# Patient Record
Sex: MALE | Race: BLACK OR AFRICAN AMERICAN | Employment: FULL TIME | URBAN - METROPOLITAN AREA
[De-identification: names, ages, dates, MRNs, and addresses within clinical notes are randomized per-mention and may not be internally consistent; named-entity substitution may affect disease eponyms.]

---

## 2017-07-13 ENCOUNTER — APPOINTMENT (OUTPATIENT)
Dept: GENERAL RADIOLOGY | Age: 57
DRG: 287 | End: 2017-07-13
Attending: EMERGENCY MEDICINE
Payer: COMMERCIAL

## 2017-07-13 ENCOUNTER — HOSPITAL ENCOUNTER (INPATIENT)
Age: 57
LOS: 2 days | Discharge: HOME | DRG: 287 | End: 2017-07-15
Attending: EMERGENCY MEDICINE | Admitting: INTERNAL MEDICINE
Payer: COMMERCIAL

## 2017-07-13 DIAGNOSIS — I24.9 ACS (ACUTE CORONARY SYNDROME) (HCC): Primary | ICD-10-CM

## 2017-07-13 DIAGNOSIS — R77.8 ELEVATED TROPONIN: ICD-10-CM

## 2017-07-13 DIAGNOSIS — R53.83 FATIGUE, UNSPECIFIED TYPE: ICD-10-CM

## 2017-07-13 PROBLEM — I10 ESSENTIAL HYPERTENSION: Status: ACTIVE | Noted: 2017-07-13

## 2017-07-13 PROBLEM — Z72.0 TOBACCO ABUSE: Status: ACTIVE | Noted: 2017-07-13

## 2017-07-13 LAB
ALBUMIN SERPL BCP-MCNC: 3.6 G/DL (ref 3.5–5)
ALBUMIN/GLOB SERPL: 0.9 {RATIO} (ref 1.1–2.2)
ALP SERPL-CCNC: 108 U/L (ref 45–117)
ALT SERPL-CCNC: 60 U/L (ref 12–78)
ANION GAP BLD CALC-SCNC: 8 MMOL/L (ref 5–15)
APPEARANCE UR: CLEAR
AST SERPL W P-5'-P-CCNC: 56 U/L (ref 15–37)
BACTERIA URNS QL MICRO: NEGATIVE /HPF
BASOPHILS # BLD AUTO: 0.1 K/UL (ref 0–0.1)
BASOPHILS # BLD: 2 % (ref 0–1)
BILIRUB SERPL-MCNC: 0.4 MG/DL (ref 0.2–1)
BILIRUB UR QL CFM: NEGATIVE
BUN SERPL-MCNC: 13 MG/DL (ref 6–20)
BUN/CREAT SERPL: 9 (ref 12–20)
CALCIUM SERPL-MCNC: 8.6 MG/DL (ref 8.5–10.1)
CAOX CRY URNS QL MICRO: ABNORMAL
CHLORIDE SERPL-SCNC: 110 MMOL/L (ref 97–108)
CK MB CFR SERPL CALC: 1.5 % (ref 0–2.5)
CK MB SERPL-MCNC: 2.2 NG/ML (ref 5–25)
CK SERPL-CCNC: 142 U/L (ref 39–308)
CK SERPL-CCNC: 180 U/L (ref 39–308)
CO2 SERPL-SCNC: 21 MMOL/L (ref 21–32)
COLOR UR: ABNORMAL
CREAT SERPL-MCNC: 1.45 MG/DL (ref 0.7–1.3)
EOSINOPHIL # BLD: 0.6 K/UL (ref 0–0.4)
EOSINOPHIL NFR BLD: 7 % (ref 0–7)
EPITH CASTS URNS QL MICRO: ABNORMAL /LPF
ERYTHROCYTE [DISTWIDTH] IN BLOOD BY AUTOMATED COUNT: 12.4 % (ref 11.5–14.5)
ETHANOL SERPL-MCNC: 176 MG/DL
GLOBULIN SER CALC-MCNC: 4.2 G/DL (ref 2–4)
GLUCOSE BLD STRIP.AUTO-MCNC: 94 MG/DL (ref 65–100)
GLUCOSE SERPL-MCNC: 94 MG/DL (ref 65–100)
GLUCOSE UR STRIP.AUTO-MCNC: NEGATIVE MG/DL
HCT VFR BLD AUTO: 45 % (ref 36.6–50.3)
HGB BLD-MCNC: 15.9 G/DL (ref 12.1–17)
HGB UR QL STRIP: NEGATIVE
KETONES UR QL STRIP.AUTO: ABNORMAL MG/DL
LEUKOCYTE ESTERASE UR QL STRIP.AUTO: NEGATIVE
LYMPHOCYTES # BLD AUTO: 44 % (ref 12–49)
LYMPHOCYTES # BLD: 3.9 K/UL (ref 0.8–3.5)
MCH RBC QN AUTO: 33.1 PG (ref 26–34)
MCHC RBC AUTO-ENTMCNC: 35.3 G/DL (ref 30–36.5)
MCV RBC AUTO: 93.8 FL (ref 80–99)
MONOCYTES # BLD: 0.4 K/UL (ref 0–1)
MONOCYTES NFR BLD AUTO: 5 % (ref 5–13)
NEUTS SEG # BLD: 3.9 K/UL (ref 1.8–8)
NEUTS SEG NFR BLD AUTO: 42 % (ref 32–75)
NITRITE UR QL STRIP.AUTO: NEGATIVE
PH UR STRIP: 5 [PH] (ref 5–8)
PLATELET # BLD AUTO: 247 K/UL (ref 150–400)
POTASSIUM SERPL-SCNC: 3.7 MMOL/L (ref 3.5–5.1)
PROT SERPL-MCNC: 7.8 G/DL (ref 6.4–8.2)
PROT UR STRIP-MCNC: ABNORMAL MG/DL
RBC # BLD AUTO: 4.8 M/UL (ref 4.1–5.7)
RBC #/AREA URNS HPF: ABNORMAL /HPF (ref 0–5)
SERVICE CMNT-IMP: NORMAL
SODIUM SERPL-SCNC: 139 MMOL/L (ref 136–145)
SP GR UR REFRACTOMETRY: 1.02 (ref 1–1.03)
TROPONIN I SERPL-MCNC: 0.88 NG/ML
TROPONIN I SERPL-MCNC: 0.99 NG/ML
UA: UC IF INDICATED,UAUC: ABNORMAL
UROBILINOGEN UR QL STRIP.AUTO: 1 EU/DL (ref 0.2–1)
WBC # BLD AUTO: 9 K/UL (ref 4.1–11.1)
WBC URNS QL MICRO: ABNORMAL /HPF (ref 0–4)

## 2017-07-13 PROCEDURE — 80053 COMPREHEN METABOLIC PANEL: CPT | Performed by: EMERGENCY MEDICINE

## 2017-07-13 PROCEDURE — 80307 DRUG TEST PRSMV CHEM ANLYZR: CPT | Performed by: EMERGENCY MEDICINE

## 2017-07-13 PROCEDURE — 96361 HYDRATE IV INFUSION ADD-ON: CPT

## 2017-07-13 PROCEDURE — 93005 ELECTROCARDIOGRAM TRACING: CPT

## 2017-07-13 PROCEDURE — 82553 CREATINE MB FRACTION: CPT | Performed by: INTERNAL MEDICINE

## 2017-07-13 PROCEDURE — 81001 URINALYSIS AUTO W/SCOPE: CPT | Performed by: EMERGENCY MEDICINE

## 2017-07-13 PROCEDURE — 84484 ASSAY OF TROPONIN QUANT: CPT | Performed by: EMERGENCY MEDICINE

## 2017-07-13 PROCEDURE — 99285 EMERGENCY DEPT VISIT HI MDM: CPT

## 2017-07-13 PROCEDURE — 74011250636 HC RX REV CODE- 250/636: Performed by: INTERNAL MEDICINE

## 2017-07-13 PROCEDURE — 82550 ASSAY OF CK (CPK): CPT | Performed by: EMERGENCY MEDICINE

## 2017-07-13 PROCEDURE — 36415 COLL VENOUS BLD VENIPUNCTURE: CPT | Performed by: EMERGENCY MEDICINE

## 2017-07-13 PROCEDURE — 82962 GLUCOSE BLOOD TEST: CPT

## 2017-07-13 PROCEDURE — 96360 HYDRATION IV INFUSION INIT: CPT

## 2017-07-13 PROCEDURE — 96374 THER/PROPH/DIAG INJ IV PUSH: CPT

## 2017-07-13 PROCEDURE — 85025 COMPLETE CBC W/AUTO DIFF WBC: CPT | Performed by: EMERGENCY MEDICINE

## 2017-07-13 PROCEDURE — 74011250636 HC RX REV CODE- 250/636: Performed by: EMERGENCY MEDICINE

## 2017-07-13 PROCEDURE — 65660000000 HC RM CCU STEPDOWN

## 2017-07-13 PROCEDURE — 74011250637 HC RX REV CODE- 250/637: Performed by: EMERGENCY MEDICINE

## 2017-07-13 PROCEDURE — 74011250637 HC RX REV CODE- 250/637: Performed by: INTERNAL MEDICINE

## 2017-07-13 PROCEDURE — 71020 XR CHEST PA LAT: CPT

## 2017-07-13 RX ORDER — SODIUM CHLORIDE 0.9 % (FLUSH) 0.9 %
5-10 SYRINGE (ML) INJECTION EVERY 8 HOURS
Status: DISCONTINUED | OUTPATIENT
Start: 2017-07-13 | End: 2017-07-15 | Stop reason: HOSPADM

## 2017-07-13 RX ORDER — METOPROLOL TARTRATE 25 MG/1
25 TABLET, FILM COATED ORAL 2 TIMES DAILY
Status: DISCONTINUED | OUTPATIENT
Start: 2017-07-13 | End: 2017-07-14

## 2017-07-13 RX ORDER — GUAIFENESIN 100 MG/5ML
324 LIQUID (ML) ORAL
Status: COMPLETED | OUTPATIENT
Start: 2017-07-13 | End: 2017-07-13

## 2017-07-13 RX ORDER — NITROGLYCERIN 0.4 MG/1
0.4 TABLET SUBLINGUAL AS NEEDED
Status: DISCONTINUED | OUTPATIENT
Start: 2017-07-13 | End: 2017-07-15 | Stop reason: HOSPADM

## 2017-07-13 RX ORDER — ASPIRIN 81 MG/1
81 TABLET ORAL DAILY
Status: DISCONTINUED | OUTPATIENT
Start: 2017-07-14 | End: 2017-07-15 | Stop reason: HOSPADM

## 2017-07-13 RX ORDER — ATORVASTATIN CALCIUM 20 MG/1
20 TABLET, FILM COATED ORAL
Status: DISCONTINUED | OUTPATIENT
Start: 2017-07-13 | End: 2017-07-15 | Stop reason: HOSPADM

## 2017-07-13 RX ORDER — ENOXAPARIN SODIUM 100 MG/ML
1 INJECTION SUBCUTANEOUS EVERY 24 HOURS
Status: COMPLETED | OUTPATIENT
Start: 2017-07-13 | End: 2017-07-13

## 2017-07-13 RX ORDER — SODIUM CHLORIDE 9 MG/ML
125 INJECTION, SOLUTION INTRAVENOUS CONTINUOUS
Status: DISCONTINUED | OUTPATIENT
Start: 2017-07-13 | End: 2017-07-15 | Stop reason: HOSPADM

## 2017-07-13 RX ORDER — SODIUM CHLORIDE 0.9 % (FLUSH) 0.9 %
5-10 SYRINGE (ML) INJECTION AS NEEDED
Status: DISCONTINUED | OUTPATIENT
Start: 2017-07-13 | End: 2017-07-15 | Stop reason: HOSPADM

## 2017-07-13 RX ADMIN — METOPROLOL TARTRATE 25 MG: 25 TABLET ORAL at 20:57

## 2017-07-13 RX ADMIN — ATORVASTATIN CALCIUM 20 MG: 20 TABLET, FILM COATED ORAL at 23:39

## 2017-07-13 RX ADMIN — SODIUM CHLORIDE 1000 ML: 900 INJECTION, SOLUTION INTRAVENOUS at 19:07

## 2017-07-13 RX ADMIN — ASPIRIN 81 MG 324 MG: 81 TABLET ORAL at 19:48

## 2017-07-13 RX ADMIN — NITROGLYCERIN 1 INCH: 20 OINTMENT TOPICAL at 23:39

## 2017-07-13 RX ADMIN — SODIUM CHLORIDE 125 ML/HR: 900 INJECTION, SOLUTION INTRAVENOUS at 23:39

## 2017-07-13 RX ADMIN — ENOXAPARIN SODIUM 80 MG: 80 INJECTION SUBCUTANEOUS at 20:57

## 2017-07-13 RX ADMIN — NITROGLYCERIN 1 INCH: 20 OINTMENT TOPICAL at 20:56

## 2017-07-13 NOTE — ED PROVIDER NOTES
HPI Comments: Roseanna Bishop, 49 y.o. male, presents via EMS to ED [facility] ED with cc of malaise which began in the late afternoon. Patient states he was resting at time of onset and stood up to prepare food when he began to feel unwell. Patient's friend states that she noticed that pt looked unwell, and she had to support him to prevent him from falling. He reports having one beer today, but denies any drug use today. He otherwise does not have any specific complaints and denies SI, HI, dizziness, lightheadedness, CP, dyspnea, N/V/D, abd pain, cough, LOC, syncope, fall, trauma, HA, speech difficulty, focal weakness, changes in PO intake, sputum production or heat exposure today. Patient states that he has a h/o Legionnaire's dz. PCP: None    PMHx significant for: HTN, DM, Legionnaire's dz  PSHx significant for: L knee replacement   Social history significant for: + Tobacco, + EtOH, - Illicit Drug Use    There are no other complaints, changes, or physical findings at this time. Written by Berkley Yanes ED Scribjose luis, as dictated by Derian Garibay MD.       The history is provided by the patient. No  was used. Past Medical History:   Diagnosis Date    Diabetes ([address]     Hypertension        Past Surgical History:   Procedure Laterality Date    HX KNEE REPLACEMENT      left         History reviewed. No pertinent family history. Social History     Social History    Marital status: SINGLE     Spouse name: N/A    Number of children: N/A    Years of education: N/A     Occupational History    Not on file. Social History Main Topics    Smoking status: Current Every Day Smoker     Packs/day: 1.00    Smokeless tobacco: Never Used    Alcohol use Yes      Comment: 2 beers     Drug use: No    Sexual activity: Not on file     Other Topics Concern    Not on file     Social History Narrative         ALLERGIES: Review of patient's allergies indicates no known allergies.     Review of Systems   Constitutional: Negative for chills, fatigue and fever. Positive for malaise   HENT: Negative for congestion, rhinorrhea and sore throat. Eyes: Negative for pain, discharge and visual disturbance. Respiratory: Negative for cough, chest tightness, shortness of breath and wheezing. Cardiovascular: Negative for chest pain, palpitations and leg swelling. Gastrointestinal: Negative for abdominal pain, constipation, diarrhea, nausea and vomiting. Genitourinary: Negative for dysuria, frequency and hematuria. Musculoskeletal: Negative for arthralgias, back pain and myalgias. Skin: Negative for rash. Neurological: Negative for dizziness, weakness, light-headedness, numbness and headaches. Psychiatric/Behavioral: Negative. Negative for suicidal ideas. Positive for EtOH consumption. Negative for HI       Patient Vitals for the past 12 hrs:   Temp Pulse Resp BP SpO2   07/13/17 2310 - 78 17 - 94 %   07/13/17 2223 - 79 19 - (!) 89 %   07/13/17 2200 - 78 18 162/90 90 %   07/13/17 1945 - 89 20 189/85 95 %   07/13/17 1930 - 83 20 149/82 93 %   07/13/17 1915 - 86 22 147/78 93 %   07/13/17 1900 - 94 26 (!) 156/97 94 %   07/13/17 1803 98.3 °F (36.8 °C) 97 18 (!) 149/103 97 %        Physical Exam   Constitutional: He is oriented to person, place, and time. He appears well-developed and well-nourished. No distress. Appears intoxicated   HENT:   Head: Normocephalic and atraumatic. Mouth/Throat: Mucous membranes are dry. Eyes: EOM are normal. Right eye exhibits no discharge. Left eye exhibits no discharge. No scleral icterus. Neck: Normal range of motion. Neck supple. No tracheal deviation present. Cardiovascular: Normal rate, regular rhythm, normal heart sounds and intact distal pulses. Exam reveals no gallop and no friction rub. No murmur heard. Pulmonary/Chest: Effort normal and breath sounds normal. No respiratory distress. He has no wheezes. He has no rales.    Abdominal: Soft. He exhibits no distension. There is no tenderness. Musculoskeletal: Normal range of motion. He exhibits no edema. Lymphadenopathy:     He has no cervical adenopathy. Neurological: He is alert and oriented to person, place, and time. Skin: Skin is warm and dry. No rash noted. Psychiatric: He has a normal mood and affect. Nursing note and vitals reviewed. MDM  Number of Diagnoses or Management Options  ACS (acute coronary syndrome) [facility]):   Elevated troponin:   Fatigue, unspecified type:   Diagnosis management comments:     Patient presents to ED with generalized fatigue but is otherwise asymptomatic. EKG is abnormal.  Differential is broad and includes dehydration, intoxication, ACS, arrhythmia, electrolyte abnormality, UTI, pneumonia, CVA  - CBC, CMP, Damaso, UA  - Alcohol level  - CXR  - IVF         Amount and/or Complexity of Data Reviewed  Clinical lab tests: ordered and reviewed  Tests in the radiology section of CPT®: ordered and reviewed  Tests in the medicine section of CPT®: ordered and reviewed  Discussion of test results with the performing providers: yes  Review and summarize past medical records: yes  Discuss the patient with other providers: yes (Cardiologist)  Independent visualization of images, tracings, or specimens: yes    Patient Progress  Patient progress: stable    ED Course       Procedures     EKG interpretation: (Preliminary) 18:09  Rhythm: sinus tachycardia; and regular . Rate (approx.): 101; Axis: normal; CA interval: normal; QRS interval: normal ; ST/T wave: twi and ST depressions; in  Lead: I, aVL, V5 and V6; Other findings: no prior ekgs available for comparison. Written by KYLEIGH Pardo, as dictated by Gonzalo Burnett MD.     Progress Note  7:11 PM  RN reports pt's Tn elevated at 0.99. MD aware. Will consult cardiology. Pt denies chest pain.   Written by KYLEIGH Pardo, as dictated by Gonzalo Burnett MD.      Consult Note  7:28 PM  Melissa Campos MD spoke with Dr. Rita Delgado,   Specialty: Cardiologist   Discussed pt's hx, disposition, and available diagnostic and imaging results. Reviewed care plans. Consultant recommends plan as outlined. cardiologist to see and admit patient. Written by Bennie Garces, ED Scribe, as dictated by Melissa Campos MD.       LABORATORY TESTS:  Recent Results (from the past 12 hour(s))   EKG, 12 LEAD, INITIAL    Collection Time: 07/13/17  6:09 PM   Result Value Ref Range    Ventricular Rate 101 BPM    Atrial Rate 101 BPM    P-R Interval 148 ms    QRS Duration 102 ms    Q-T Interval 376 ms    QTC Calculation (Bezet) 487 ms    Calculated P Axis 11 degrees    Calculated R Axis -21 degrees    Calculated T Axis 139 degrees    Diagnosis       Sinus tachycardia with premature atrial complexes  Left ventricular hypertrophy with repolarization abnormality  Anterior infarct , age undetermined  Abnormal ECG  No previous ECGs available     CBC WITH AUTOMATED DIFF    Collection Time: 07/13/17  6:19 PM   Result Value Ref Range    WBC 9.0 4.1 - 11.1 K/uL    RBC 4.80 4. 10 - 5.70 M/uL    HGB 15.9 12.1 - 17.0 g/dL    HCT 45.0 36.6 - 50.3 %    MCV 93.8 80.0 - 99.0 FL    MCH 33.1 26.0 - 34.0 PG    MCHC 35.3 30.0 - 36.5 g/dL    RDW 12.4 11.5 - 14.5 %    PLATELET 184 025 - 549 K/uL    NEUTROPHILS 42 32 - 75 %    LYMPHOCYTES 44 12 - 49 %    MONOCYTES 5 5 - 13 %    EOSINOPHILS 7 0 - 7 %    BASOPHILS 2 (H) 0 - 1 %    ABS. NEUTROPHILS 3.9 1.8 - 8.0 K/UL    ABS. LYMPHOCYTES 3.9 (H) 0.8 - 3.5 K/UL    ABS. MONOCYTES 0.4 0.0 - 1.0 K/UL    ABS. EOSINOPHILS 0.6 (H) 0.0 - 0.4 K/UL    ABS.  BASOPHILS 0.1 0.0 - 0.1 K/UL   METABOLIC PANEL, COMPREHENSIVE    Collection Time: 07/13/17  6:19 PM   Result Value Ref Range    Sodium 139 136 - 145 mmol/L    Potassium 3.7 3.5 - 5.1 mmol/L    Chloride 110 (H) 97 - 108 mmol/L    CO2 21 21 - 32 mmol/L    Anion gap 8 5 - 15 mmol/L    Glucose 94 65 - 100 mg/dL    BUN 13 6 - 20 MG/DL    Creatinine 1.45 (H) 0.70 - 1.30 MG/DL    BUN/Creatinine ratio 9 (L) 12 - 20      GFR est AA >60 >60 ml/min/1.73m2    GFR est non-AA 51 (L) >60 ml/min/1.73m2    Calcium 8.6 8.5 - 10.1 MG/DL    Bilirubin, total 0.4 0.2 - 1.0 MG/DL    ALT (SGPT) 60 12 - 78 U/L    AST (SGOT) 56 (H) 15 - 37 U/L    Alk.  phosphatase 108 45 - 117 U/L    Protein, total 7.8 6.4 - 8.2 g/dL    Albumin 3.6 3.5 - 5.0 g/dL    Globulin 4.2 (H) 2.0 - 4.0 g/dL    A-G Ratio 0.9 (L) 1.1 - 2.2     CK W/ REFLX CKMB    Collection Time: 07/13/17  6:19 PM   Result Value Ref Range     39 - 308 U/L   TROPONIN I    Collection Time: 07/13/17  6:19 PM   Result Value Ref Range    Troponin-I, Qt. 0.99 (H) <0.05 ng/mL   GLUCOSE, POC    Collection Time: 07/13/17  6:33 PM   Result Value Ref Range    Glucose (POC) 94 65 - 100 mg/dL    Performed by Zayda Perry    ETHYL ALCOHOL    Collection Time: 07/13/17  7:07 PM   Result Value Ref Range    ALCOHOL(ETHYL),SERUM 176 (H) <10 MG/DL   URINALYSIS W/ REFLEX CULTURE    Collection Time: 07/13/17  7:45 PM   Result Value Ref Range    Color DARK YELLOW      Appearance CLEAR CLEAR      Specific gravity 1.022 1.003 - 1.030      pH (UA) 5.0 5.0 - 8.0      Protein TRACE (A) NEG mg/dL    Glucose NEGATIVE  NEG mg/dL    Ketone TRACE (A) NEG mg/dL    Blood NEGATIVE  NEG      Urobilinogen 1.0 0.2 - 1.0 EU/dL    Nitrites NEGATIVE  NEG      Leukocyte Esterase NEGATIVE  NEG      WBC 0-4 0 - 4 /hpf    RBC 0-5 0 - 5 /hpf    Epithelial cells FEW FEW /lpf    Bacteria NEGATIVE  NEG /hpf    UA:UC IF INDICATED CULTURE NOT INDICATED BY UA RESULT CNI      CA Oxalate crystals 1+ (A) NEG   BILIRUBIN, CONFIRM    Collection Time: 07/13/17  7:45 PM   Result Value Ref Range    Bilirubin UA, confirm NEGATIVE  NEG     CK W/ CKMB & INDEX    Collection Time: 07/13/17  9:36 PM   Result Value Ref Range     39 - 308 U/L    CK - MB 2.2 <3.6 NG/ML    CK-MB Index 1.5 0 - 2.5     TROPONIN I    Collection Time: 07/13/17  9:36 PM   Result Value Ref Range Troponin-I, Qt. 0.88 (H) <0.05 ng/mL   EKG, 12 LEAD, SUBSEQUENT    Collection Time: 07/13/17 10:28 PM   Result Value Ref Range    Ventricular Rate 75 BPM    Atrial Rate 75 BPM    P-R Interval 162 ms    QRS Duration 106 ms    Q-T Interval 434 ms    QTC Calculation (Bezet) 484 ms    Calculated P Axis 26 degrees    Calculated R Axis -34 degrees    Calculated T Axis -61 degrees    Diagnosis       Normal sinus rhythm  Possible Left atrial enlargement  Left axis deviation  Left ventricular hypertrophy  Cannot rule out Septal infarct , age undetermined  T wave abnormality, consider lateral ischemia  When compared with ECG of 13-JUL-2017 18:09,  MANUAL COMPARISON REQUIRED, DATA IS UNCONFIRMED         IMAGING RESULTS:  CXR Results  (Last 48 hours)               07/13/17 2001  XR CHEST PA LAT Final result    Impression:  IMPRESSION:   NORMAL CHEST. Narrative:  History: Dizziness. Frontal and lateral views of the chest show clear lungs. The heart, mediastinum   and pulmonary vasculature are normal.  The bony thorax is unremarkable. MEDICATIONS GIVEN:  Medications   0.9% sodium chloride infusion (not administered)   sodium chloride (NS) flush 5-10 mL (not administered)   sodium chloride (NS) flush 5-10 mL (not administered)   nitroglycerin (NITROBID) 2 % ointment 1 Inch (1 Inch Topical Given 7/13/17 2056)   nitroglycerin (NITROSTAT) tablet 0.4 mg (not administered)   metoprolol tartrate (LOPRESSOR) tablet 25 mg (25 mg Oral Given 7/13/17 2057)   aspirin delayed-release tablet 81 mg (not administered)   atorvastatin (LIPITOR) tablet 20 mg (not administered)   sodium chloride 0.9 % bolus infusion 1,000 mL (1,000 mL IntraVENous New Bag 7/13/17 1907)   aspirin chewable tablet 324 mg (324 mg Oral Given 7/13/17 1948)   enoxaparin (LOVENOX) injection 80 mg (80 mg SubCUTAneous Given 7/13/17 2057)       IMPRESSION:  1. ACS (acute coronary syndrome) ([facility] [address]    2. Fatigue, unspecified type    3. Elevated troponin        PLAN:  1. Admit to cardiology. Admit Note:  7:28 PM  Pt is being admitted by Dr. Fabian Osuna, cardiologist. The results of their tests and reason(s) for their admission have been discussed with pt and/or available family. They convey agreement and understanding for the need to be admitted and for admission diagnosis. This note is prepared by Cesar Alvarez, acting as a Scribe for Susan Cantu MD.    Susan Cantu MD: The scribe's documentation has been prepared under my direction and personally reviewed by me in its entirety. I confirm that the notes above accurately reflects all work, treatment, procedures, and medical decision making performed by me.

## 2017-07-13 NOTE — ED NOTES
Assumed care of pt at this time, received bedside report from [address] with pt included in care. Pt is resting in room, with call bell in reach. All questions answered.

## 2017-07-13 NOTE — IP AVS SNAPSHOT
[address] [facility] 
[phone] Patient: Juan Carlos Albarado MRN: [id number] GDR:3/14/[birth year]145 You are allergic to the following No active allergies Recent Documentation Height Weight BMI Smoking Status 1.956 m 83.9 kg 21.94 kg/m2 Current Every Day Smoker Emergency Contacts Name Discharge Info Relation Home Work Mobile Rustam Kaur  Father [15]   [phone] About your hospitalization You were admitted on:  July 13, 2017 You last received care in the:  [facility] CARDIO You were discharged on:  July 15, 2017 Unit phone number:  [phone] Why you were hospitalized Your primary diagnosis was:  Nonischemic Dilated Cardiomyopathy (Hcc) Your diagnoses also included:  Essential Hypertension, Tobacco Abuse, S/P Cardiac Cath Providers Seen During Your Hospitalizations Provider Role Specialty Primary office phone Jonathon Dailey MD Attending Provider Emergency Medicine [phone] Onesimo Aguillon MD Attending Provider Cardiology [phone] Your Primary Care Physician (PCP) Primary Care Physician Office Phone Office Fax NONE ** None ** ** None ** Follow-up Information Follow up With Details Comments Contact Info Onesimo Aguillon MD Schedule an appointment as soon as possible for a visit in 1 week  [address] [place] [address] 
[phone] Current Discharge Medication List  
  
START taking these medications Dose & Instructions Dispensing Information Comments Morning Noon Evening Bedtime  
 amLODIPine 10 mg tablet Commonly known as:  Tavares Butterfield Your last dose was: Your next dose is:    
   
   
 Dose:  10 mg Take 1 Tab by mouth daily. Indications: hypertension Quantity:  30 Tab Refills:  11  
     
   
   
   
  
 aspirin delayed-release 81 mg tablet Your last dose was: Your next dose is:    
   
   
 Dose:  81 mg Take 1 Tab by mouth daily. Quantity:  30 Tab Refills:  11  
     
   
   
   
  
 atorvastatin 10 mg tablet Commonly known as:  LIPITOR Your last dose was: Your next dose is:    
   
   
 Dose:  5 mg Take 0.5 Tabs by mouth nightly. Quantity:  30 Tab Refills:  6  
     
   
   
   
  
 carvedilol 12.5 mg tablet Commonly known as:  Pauline Solid Your last dose was: Your next dose is:    
   
   
 Dose:  12.5 mg Take 1 Tab by mouth two (2) times daily (with meals). Indications: Chronic Heart Failure Quantity:  60 Tab Refills:  11  
     
   
   
   
  
 lisinopril 10 mg tablet Commonly known as:  Pecola Cypher Your last dose was: Your next dose is:    
   
   
 Dose:  10 mg Take 1 Tab by mouth daily. Indications: Chronic Heart Failure Quantity:  30 Tab Refills:  11 Where to Get Your Medications These medications were sent to [facility], [address] [address] Phone:  [phone]  
  amLODIPine 10 mg tablet  
 aspirin delayed-release 81 mg tablet  
 atorvastatin 10 mg tablet  
 carvedilol 12.5 mg tablet  
 lisinopril 10 mg tablet Discharge Instructions [address] [id number] CARDIOLOGY DISCHARGE INSTRUCTIONS Patient ID: 
Meet Espino [id number] 
49 y.o. 
1960 Admit Date: 7/13/2017 Discharge Date: 7/15/2017 Admitting Physician: Gilles Alfonso MD  
 
Discharge Physician: Dr. Lily Wilson Admission Diagnoses:  
ACS (acute coronary syndrome) ([facility] Discharge Diagnoses:  
Principal Problem: 
  ACS (acute coronary syndrome) ([facility].) (7/13/2017) Active Problems: Essential hypertension (7/13/2017) Tobacco abuse (7/13/2017) Dilated cardiomyopathy ([facility] (7/14/2017) S/P cardiac cath (7/14/2017) Overview: 7/14/17: No significant CAD Discharge Condition: Good Cardiology Procedures this Admission:  Diagnostic left heart catheterization EchoCardiogram 
 
Disposition: home Reference discharge instructions provided by nursing for diet and activity. Signed: 
Keny Snider NP 
7/14/2017 
3:26 PM 
 
CARDIAC CATHETERIZATION/PCI DISCHARGE INSTRUCTIONS It is normal to feel tired the first couple days. Take it easy and follow the physicians instructions. CHECK THE CATHETER INSERTION SITE DAILY: 
 
You may shower 24 hours after the procedure, remove the bandage during showering. Wash with soap and water and pat dry. Gentle cleaning of the site with soap and water is sufficient, cover with a dry clean dressing or bandage. Do not apply creams or powders to the area. Do not sit in a bathtub or pool of water for 7 days or until wound has completely healed. Temporary bruising and discomfort is normal and may last a few weeks. You may have a  formation of a small lump at the site which may last up to 6 weeks. CALL THE PHYSICIANS: 
 
If the site becomes red, swollen or feels warm to the touch If there is bleeding or drainage or if there is unusual pain at the groin or down the leg. If there is any bleeding, lie down, apply pressure or have someone apply pressure with a clean cloth until the bleeding stops. If the bleeding continues, call 911 to be transported to the hospital. 
DO  [facility] [address]. ACTIVITY: 
 
For the first 24-48 hours or as instructed by the physician: No lifting, pushing or pulling over 10 pounds and no straining the insertion site. Do not life grocery bags or the garbage can, do not run the vacuum  or  for 7 days. Start with short walks as in the hospital and gradually increase as tolerated each day. It is recommended to walk 30 minutes 5-7 days per week. Follow your physicians instructions on activity. Avoid walking outside in extremes of heat or cold. Walk inside when it is cold and windy or hot and humid. Things to keep in mind: 
No driving for at least 24 hours, or as designated by your physician. Limit the number of times you go up and down the stairs Take rests and pace yourself with activity. Be careful and do not strain with bowel movements. MEDICATIONS: 
 
Take all medications as prescribed Call your physician if you have any questions Keep an updated list of your medications with you at all times and give a list to your physician and pharmacist 
 
 
 
SIGNS AND SYMPTOMS: 
 
Be cautious of symptoms of angina or recurrent symptoms such as chest discomfort, unusual shortness of breath or fatigue. These could be symptoms of restenosis, a new blockage or a heart attack. If your symptoms are relieved with rest it is still recommended that you notify your physician of recurrent chest pain or discomfort. FOR CHEST PAIN or symptoms of angina not relieved with rest:  If the discomfort is not relieved with rest, and you have been prescribed Nitroglycerin, take as directed (taken under the tongue, one at a time 5 minutes apart for a total of 3 doses). If the discomfort is not relieved after the 3rd nitroglycerin, call 911. If you have not been prescribed Nitroglycerin  and your chest discomfort is not relieved with rest, call 911. AFTER CARE: 
 
Follow up with your physician as instructed. Follow a heart healthy diet with proper portion control, daily stress management, daily exercise, blood pressure and cholesterol control , and smoking cessation. Radial Cardiac Catheterization/Angiography Discharge Instructions It is normal to feel tired the first couple days. Take it easy and follow the physicians instructions. CHECK THE CATHETER INSERTION SITE DAILY: 
 
Remove the wrist dressing 24 hours after the procedure. You may shower 24 hours after the procedure. Wash with soap and water and pat dry. Gentle cleaning of the site with soap and water is sufficient, cover with a dry clean dressing or bandage. Do not apply creams or powders to the area. No soaking the wrist for 3 days. Leave the puncture site open to air after 24 hours post-procedure. CALL THE PHYSICIANS:  
 
If the site becomes red, swollen or feels warm to the touch If there is bleeding or drainage or if there is unusual pain at the radial site. If there is any minor oozing, you may apply a band-aid and remove after 12 hours. If the bleeding continues, hold pressure with the middle finger against the puncture site and the thumb against the back of the wrist,call 911 to be transported to the hospital. 
DO NOT DRIVE YOURSELF, [address] ACTIVITY:  
For the first 24 hours do not manipulate the wrist. 
No lifting, pushing or pulling over 3-5 pounds with the affected wrist for 7 daysand no straining the insertion site. Do not life grocery bags or the garbage can, do not run the vacuum  or  for 7 days. Start with short walks as in the hospital and gradually increase as tolerated each day. It is recommended to walk 30 minutes 5-7 days per week. Follow your physicians instructions on activity. Avoid walking outside in extremes of heat or cold. Walk inside when it is cold and windy or hot and humid. Things to keep in mind: 
No driving for at least 24 hours, or as designated by your physician. Limit the number of times you go up and down the stairs Take rests and pace yourself with activity. Be careful and do not strain with bowel movements.  
 
MEDICATIONS: 
 
 Take all medications as prescribed Call your physician if you have any questions Keep an updated list of your medications with you at all times and give a list to your physician and pharmacist 
 
SIGNS AND SYMPTOMS:  
Be cautious of symptoms of angina or recurrent symptoms such as chest discomfort, unusual shortness of breath or fatigue. These could be symptoms of restenosis, a new blockage or a heart attack. If your symptoms are relieved with rest it is still recommended that you notify your physician of recurrent chest pain or discomfort. For CHEST PAIN or symptoms of angina not relieved with rest:  If the discomfort is not relieved with rest, and you have been prescribed Nitroglycerin, take as directed (taken under the tongue, one at a time 5 minutes apart for a total of 3 doses). If the discomfort is not relieved after the 3rd nitroglycerin, call 911. If you have not been prescribed Nitroglycerin  and your chest discomfort is not relieved with rest, call 911. AFTER CARE:  
Follow up with your physician as instructed. Follow a heart healthy diet with proper portion control, daily stress management, daily exercise, blood pressure and cholesterol control , and smoking cessation. Discharge Orders None Introducing [facility]! Marcie Cardenas introduces [employer] patient portal. Now you can access parts of your medical record, email your doctor's office, and request medication refills online. 1. In your internet browser, go to [email or web address] 2. Click on the First Time User? Click Here link in the Sign In box. You will see the New Member Sign Up page. 3. Enter your [employer] Access Code exactly as it appears below. You will not need to use this code after youve completed the sign-up process. If you do not sign up before the expiration date, you must request a new code. · [employer] Access Code: [email or web address] Expires: 10/11/2017  6:21 PM 
 
 4. Enter the last four digits of your Social Security Number (xxxx) and Date of Birth (mm/dd/yyyy) as indicated and click Submit. You will be taken to the next sign-up page. 5. Create a [employer] ID. This will be your [employer] login ID and cannot be changed, so think of one that is secure and easy to remember. 6. Create a [employer] password. You can change your password at any time. 7. Enter your Password Reset Question and Answer. This can be used at a later time if you forget your password. 8. Enter your e-mail address. You will receive e-mail notification when new information is available in [address] 9. Click Sign Up. You can now view and download portions of your medical record. 10. Click the Download Summary menu link to download a portable copy of your medical information. If you have questions, please visit the Frequently Asked Questions section of the [employer] website. Remember, [employer] is NOT to be used for urgent needs. For medical emergencies, dial 911. Now available from your iPhone and Android! General Information Please provide this summary of care documentation to your next provider. Patient Signature:  ____________________________________________________________ Date:  ____________________________________________________________  
  
Claudene Born Provider Signature:  ____________________________________________________________ Date:  ____________________________________________________________

## 2017-07-13 NOTE — ED NOTES
Patients visitor who stated she was his cousin was found laying in stretcher with the patient upon this nurse entering room. Asked for visitor to move to chair so I could obtain labs and start fluids on this patient. Visitor now sitting in chair in room.

## 2017-07-13 NOTE — ED NOTES
Assumed care of this patient from triage. Pt reported he hasn't been feeling well for 2 days, very vague with symptoms. When asked if patient could elaborate on \"not feeling well\" he reported \"just like tired and woosy\"  Pt also notes he has been outside working in the heat and stressed recently. Pt denies CP, SOB, dizziness, abdominal pain, N/V/D or urinary symptoms. Pt reported he has diabetes and hypertension which he is supposed to take medication for but chooses not to. Patient placed on cardiac monitor x3. Patient A&Ox4. Pts cousin at bedside with patient.

## 2017-07-14 PROBLEM — I42.0 DILATED CARDIOMYOPATHY (HCC): Status: ACTIVE | Noted: 2017-07-14

## 2017-07-14 PROBLEM — Z98.890 S/P CARDIAC CATH: Status: ACTIVE | Noted: 2017-07-14

## 2017-07-14 LAB
ANION GAP BLD CALC-SCNC: 10 MMOL/L (ref 5–15)
ATRIAL RATE: 101 BPM
ATRIAL RATE: 72 BPM
ATRIAL RATE: 75 BPM
BUN SERPL-MCNC: 10 MG/DL (ref 6–20)
BUN/CREAT SERPL: 9 (ref 12–20)
CALCIUM SERPL-MCNC: 8.3 MG/DL (ref 8.5–10.1)
CALCULATED P AXIS, ECG09: 11 DEGREES
CALCULATED P AXIS, ECG09: 26 DEGREES
CALCULATED P AXIS, ECG09: 34 DEGREES
CALCULATED R AXIS, ECG10: -20 DEGREES
CALCULATED R AXIS, ECG10: -21 DEGREES
CALCULATED R AXIS, ECG10: -34 DEGREES
CALCULATED T AXIS, ECG11: -61 DEGREES
CALCULATED T AXIS, ECG11: 139 DEGREES
CALCULATED T AXIS, ECG11: 90 DEGREES
CHLORIDE SERPL-SCNC: 109 MMOL/L (ref 97–108)
CHOLEST SERPL-MCNC: 175 MG/DL
CK MB CFR SERPL CALC: 1.9 % (ref 0–2.5)
CK MB SERPL-MCNC: 2.7 NG/ML (ref 5–25)
CK SERPL-CCNC: 140 U/L (ref 39–308)
CO2 SERPL-SCNC: 21 MMOL/L (ref 21–32)
CREAT SERPL-MCNC: 1.08 MG/DL (ref 0.7–1.3)
DIAGNOSIS, 93000: NORMAL
GLUCOSE BLD STRIP.AUTO-MCNC: 96 MG/DL (ref 65–100)
GLUCOSE BLD STRIP.AUTO-MCNC: 96 MG/DL (ref 65–100)
GLUCOSE SERPL-MCNC: 91 MG/DL (ref 65–100)
HDLC SERPL-MCNC: 112 MG/DL
HDLC SERPL: 1.6 {RATIO} (ref 0–5)
LDLC SERPL CALC-MCNC: 44.6 MG/DL (ref 0–100)
LIPID PROFILE,FLP: NORMAL
P-R INTERVAL, ECG05: 148 MS
P-R INTERVAL, ECG05: 154 MS
P-R INTERVAL, ECG05: 162 MS
POTASSIUM SERPL-SCNC: 3.5 MMOL/L (ref 3.5–5.1)
Q-T INTERVAL, ECG07: 376 MS
Q-T INTERVAL, ECG07: 434 MS
Q-T INTERVAL, ECG07: 434 MS
QRS DURATION, ECG06: 102 MS
QRS DURATION, ECG06: 106 MS
QRS DURATION, ECG06: 108 MS
QTC CALCULATION (BEZET), ECG08: 475 MS
QTC CALCULATION (BEZET), ECG08: 484 MS
QTC CALCULATION (BEZET), ECG08: 487 MS
SERVICE CMNT-IMP: NORMAL
SERVICE CMNT-IMP: NORMAL
SODIUM SERPL-SCNC: 140 MMOL/L (ref 136–145)
TRIGL SERPL-MCNC: 92 MG/DL (ref ?–150)
TROPONIN I SERPL-MCNC: 0.99 NG/ML
VENTRICULAR RATE, ECG03: 101 BPM
VENTRICULAR RATE, ECG03: 72 BPM
VENTRICULAR RATE, ECG03: 75 BPM
VLDLC SERPL CALC-MCNC: 18.4 MG/DL

## 2017-07-14 PROCEDURE — 65660000000 HC RM CCU STEPDOWN

## 2017-07-14 PROCEDURE — B2111ZZ FLUOROSCOPY OF MULTIPLE CORONARY ARTERIES USING LOW OSMOLAR CONTRAST: ICD-10-PCS | Performed by: INTERNAL MEDICINE

## 2017-07-14 PROCEDURE — 74011000250 HC RX REV CODE- 250: Performed by: INTERNAL MEDICINE

## 2017-07-14 PROCEDURE — 77030008543 HC TBNG MON PRSS MRTM -A

## 2017-07-14 PROCEDURE — 93005 ELECTROCARDIOGRAM TRACING: CPT

## 2017-07-14 PROCEDURE — 77030010221 HC SPLNT WR POS TELE -B

## 2017-07-14 PROCEDURE — C1769 GUIDE WIRE: HCPCS

## 2017-07-14 PROCEDURE — 80048 BASIC METABOLIC PNL TOTAL CA: CPT | Performed by: INTERNAL MEDICINE

## 2017-07-14 PROCEDURE — 84484 ASSAY OF TROPONIN QUANT: CPT | Performed by: INTERNAL MEDICINE

## 2017-07-14 PROCEDURE — B2151ZZ FLUOROSCOPY OF LEFT HEART USING LOW OSMOLAR CONTRAST: ICD-10-PCS | Performed by: INTERNAL MEDICINE

## 2017-07-14 PROCEDURE — 36415 COLL VENOUS BLD VENIPUNCTURE: CPT | Performed by: INTERNAL MEDICINE

## 2017-07-14 PROCEDURE — 74011250636 HC RX REV CODE- 250/636: Performed by: INTERNAL MEDICINE

## 2017-07-14 PROCEDURE — C1894 INTRO/SHEATH, NON-LASER: HCPCS

## 2017-07-14 PROCEDURE — 4A023N7 MEASUREMENT OF CARDIAC SAMPLING AND PRESSURE, LEFT HEART, PERCUTANEOUS APPROACH: ICD-10-PCS | Performed by: INTERNAL MEDICINE

## 2017-07-14 PROCEDURE — 74011000250 HC RX REV CODE- 250

## 2017-07-14 PROCEDURE — 93306 TTE W/DOPPLER COMPLETE: CPT

## 2017-07-14 PROCEDURE — 77030004549 HC CATH ANGI DX PRF MRTM -A

## 2017-07-14 PROCEDURE — 82962 GLUCOSE BLOOD TEST: CPT

## 2017-07-14 PROCEDURE — 99152 MOD SED SAME PHYS/QHP 5/>YRS: CPT

## 2017-07-14 PROCEDURE — 77030000299 HC FEMSTP COMP GLD STJU -B

## 2017-07-14 PROCEDURE — 77030019569 HC BND COMPR RAD TERU -B

## 2017-07-14 PROCEDURE — 74011250636 HC RX REV CODE- 250/636

## 2017-07-14 PROCEDURE — 74011636320 HC RX REV CODE- 636/320

## 2017-07-14 PROCEDURE — 77030015766

## 2017-07-14 PROCEDURE — 77030019698 HC SYR ANGI MDLON MRTM -A

## 2017-07-14 PROCEDURE — 74011636320 HC RX REV CODE- 636/320: Performed by: INTERNAL MEDICINE

## 2017-07-14 PROCEDURE — 80061 LIPID PANEL: CPT | Performed by: INTERNAL MEDICINE

## 2017-07-14 PROCEDURE — B4181ZZ FLUOROSCOPY OF BILATERAL RENAL ARTERIES USING LOW OSMOLAR CONTRAST: ICD-10-PCS | Performed by: INTERNAL MEDICINE

## 2017-07-14 PROCEDURE — 74011250636 HC RX REV CODE- 250/636: Performed by: NURSE PRACTITIONER

## 2017-07-14 PROCEDURE — 74011250637 HC RX REV CODE- 250/637: Performed by: NURSE PRACTITIONER

## 2017-07-14 PROCEDURE — 74011250637 HC RX REV CODE- 250/637: Performed by: INTERNAL MEDICINE

## 2017-07-14 PROCEDURE — 82550 ASSAY OF CK (CPK): CPT | Performed by: INTERNAL MEDICINE

## 2017-07-14 RX ORDER — LIDOCAINE HYDROCHLORIDE 10 MG/ML
1-20 INJECTION INFILTRATION; PERINEURAL
Status: DISCONTINUED | OUTPATIENT
Start: 2017-07-14 | End: 2017-07-14

## 2017-07-14 RX ORDER — HYDRALAZINE HYDROCHLORIDE 20 MG/ML
20 INJECTION INTRAMUSCULAR; INTRAVENOUS
Status: DISCONTINUED | OUTPATIENT
Start: 2017-07-14 | End: 2017-07-15 | Stop reason: HOSPADM

## 2017-07-14 RX ORDER — LISINOPRIL 10 MG/1
10 TABLET ORAL DAILY
Qty: 30 TAB | Refills: 11 | Status: SHIPPED | OUTPATIENT
Start: 2017-07-14 | End: 2018-04-15

## 2017-07-14 RX ORDER — HEPARIN SODIUM 1000 [USP'U]/ML
INJECTION, SOLUTION INTRAVENOUS; SUBCUTANEOUS
Status: COMPLETED
Start: 2017-07-14 | End: 2017-07-14

## 2017-07-14 RX ORDER — SODIUM CHLORIDE 0.9 % (FLUSH) 0.9 %
5-10 SYRINGE (ML) INJECTION AS NEEDED
Status: DISCONTINUED | OUTPATIENT
Start: 2017-07-14 | End: 2017-07-15 | Stop reason: HOSPADM

## 2017-07-14 RX ORDER — FENTANYL CITRATE 50 UG/ML
INJECTION, SOLUTION INTRAMUSCULAR; INTRAVENOUS
Status: COMPLETED
Start: 2017-07-14 | End: 2017-07-14

## 2017-07-14 RX ORDER — HEPARIN SODIUM 200 [USP'U]/100ML
500 INJECTION, SOLUTION INTRAVENOUS ONCE
Status: COMPLETED | OUTPATIENT
Start: 2017-07-14 | End: 2017-07-14

## 2017-07-14 RX ORDER — LISINOPRIL 5 MG/1
10 TABLET ORAL DAILY
Status: DISCONTINUED | OUTPATIENT
Start: 2017-07-14 | End: 2017-07-15 | Stop reason: HOSPADM

## 2017-07-14 RX ORDER — MIDAZOLAM HYDROCHLORIDE 1 MG/ML
.5-2 INJECTION, SOLUTION INTRAMUSCULAR; INTRAVENOUS
Status: DISCONTINUED | OUTPATIENT
Start: 2017-07-14 | End: 2017-07-14

## 2017-07-14 RX ORDER — AMLODIPINE BESYLATE 10 MG/1
10 TABLET ORAL DAILY
Qty: 30 TAB | Refills: 11 | Status: SHIPPED | OUTPATIENT
Start: 2017-07-14 | End: 2018-04-15

## 2017-07-14 RX ORDER — HEPARIN SODIUM 200 [USP'U]/100ML
INJECTION, SOLUTION INTRAVENOUS
Status: COMPLETED
Start: 2017-07-14 | End: 2017-07-14

## 2017-07-14 RX ORDER — LIDOCAINE HYDROCHLORIDE 10 MG/ML
INJECTION, SOLUTION EPIDURAL; INFILTRATION; INTRACAUDAL; PERINEURAL
Status: COMPLETED
Start: 2017-07-14 | End: 2017-07-14

## 2017-07-14 RX ORDER — LIDOCAINE HYDROCHLORIDE 10 MG/ML
1-30 INJECTION, SOLUTION EPIDURAL; INFILTRATION; INTRACAUDAL; PERINEURAL
Status: DISCONTINUED | OUTPATIENT
Start: 2017-07-14 | End: 2017-07-14

## 2017-07-14 RX ORDER — ASPIRIN 81 MG/1
81 TABLET ORAL DAILY
Qty: 30 TAB | Refills: 11 | Status: SHIPPED | OUTPATIENT
Start: 2017-07-14 | End: 2018-04-15

## 2017-07-14 RX ORDER — FENTANYL CITRATE 50 UG/ML
25 INJECTION, SOLUTION INTRAMUSCULAR; INTRAVENOUS
Status: DISCONTINUED | OUTPATIENT
Start: 2017-07-14 | End: 2017-07-15 | Stop reason: HOSPADM

## 2017-07-14 RX ORDER — HYDROCODONE BITARTRATE AND ACETAMINOPHEN 5; 325 MG/1; MG/1
1 TABLET ORAL
Status: DISCONTINUED | OUTPATIENT
Start: 2017-07-14 | End: 2017-07-15 | Stop reason: HOSPADM

## 2017-07-14 RX ORDER — MIDAZOLAM HYDROCHLORIDE 1 MG/ML
INJECTION, SOLUTION INTRAMUSCULAR; INTRAVENOUS
Status: COMPLETED
Start: 2017-07-14 | End: 2017-07-14

## 2017-07-14 RX ORDER — VERAPAMIL HYDROCHLORIDE 2.5 MG/ML
2.5 INJECTION, SOLUTION INTRAVENOUS ONCE
Status: COMPLETED | OUTPATIENT
Start: 2017-07-14 | End: 2017-07-14

## 2017-07-14 RX ORDER — VERAPAMIL HYDROCHLORIDE 2.5 MG/ML
INJECTION, SOLUTION INTRAVENOUS
Status: COMPLETED
Start: 2017-07-14 | End: 2017-07-14

## 2017-07-14 RX ORDER — ATORVASTATIN CALCIUM 10 MG/1
5 TABLET, FILM COATED ORAL
Qty: 30 TAB | Refills: 6 | Status: SHIPPED | OUTPATIENT
Start: 2017-07-14 | End: 2018-04-15

## 2017-07-14 RX ORDER — HEPARIN SODIUM 1000 [USP'U]/ML
2500 INJECTION, SOLUTION INTRAVENOUS; SUBCUTANEOUS ONCE
Status: COMPLETED | OUTPATIENT
Start: 2017-07-14 | End: 2017-07-14

## 2017-07-14 RX ORDER — CARVEDILOL 12.5 MG/1
12.5 TABLET ORAL 2 TIMES DAILY WITH MEALS
Status: DISCONTINUED | OUTPATIENT
Start: 2017-07-14 | End: 2017-07-15 | Stop reason: HOSPADM

## 2017-07-14 RX ORDER — ACETAMINOPHEN 325 MG/1
650 TABLET ORAL
Status: DISCONTINUED | OUTPATIENT
Start: 2017-07-14 | End: 2017-07-15 | Stop reason: HOSPADM

## 2017-07-14 RX ORDER — SODIUM CHLORIDE 0.9 % (FLUSH) 0.9 %
5-10 SYRINGE (ML) INJECTION EVERY 8 HOURS
Status: DISCONTINUED | OUTPATIENT
Start: 2017-07-14 | End: 2017-07-15 | Stop reason: HOSPADM

## 2017-07-14 RX ORDER — FENTANYL CITRATE 50 UG/ML
50 INJECTION, SOLUTION INTRAMUSCULAR; INTRAVENOUS ONCE
Status: DISPENSED | OUTPATIENT
Start: 2017-07-14 | End: 2017-07-15

## 2017-07-14 RX ORDER — FENTANYL CITRATE 50 UG/ML
25-50 INJECTION, SOLUTION INTRAMUSCULAR; INTRAVENOUS
Status: DISCONTINUED | OUTPATIENT
Start: 2017-07-14 | End: 2017-07-14

## 2017-07-14 RX ORDER — NALOXONE HYDROCHLORIDE 0.4 MG/ML
0.4 INJECTION, SOLUTION INTRAMUSCULAR; INTRAVENOUS; SUBCUTANEOUS AS NEEDED
Status: DISCONTINUED | OUTPATIENT
Start: 2017-07-14 | End: 2017-07-15 | Stop reason: HOSPADM

## 2017-07-14 RX ORDER — CARVEDILOL 12.5 MG/1
12.5 TABLET ORAL 2 TIMES DAILY WITH MEALS
Qty: 60 TAB | Refills: 11 | Status: SHIPPED | OUTPATIENT
Start: 2017-07-14 | End: 2018-04-15

## 2017-07-14 RX ORDER — AMLODIPINE BESYLATE 5 MG/1
10 TABLET ORAL DAILY
Status: DISCONTINUED | OUTPATIENT
Start: 2017-07-14 | End: 2017-07-15 | Stop reason: HOSPADM

## 2017-07-14 RX ORDER — LIDOCAINE HYDROCHLORIDE 10 MG/ML
INJECTION INFILTRATION; PERINEURAL
Status: COMPLETED
Start: 2017-07-14 | End: 2017-07-14

## 2017-07-14 RX ORDER — AMLODIPINE BESYLATE 5 MG/1
10 TABLET ORAL
Status: COMPLETED | OUTPATIENT
Start: 2017-07-14 | End: 2017-07-14

## 2017-07-14 RX ADMIN — NITROGLYCERIN 200 MCG: 5 INJECTION, SOLUTION INTRAVENOUS at 12:46

## 2017-07-14 RX ADMIN — LISINOPRIL 10 MG: 5 TABLET ORAL at 17:10

## 2017-07-14 RX ADMIN — Medication 10 ML: at 06:04

## 2017-07-14 RX ADMIN — FENTANYL CITRATE 50 MCG: 50 INJECTION, SOLUTION INTRAMUSCULAR; INTRAVENOUS at 12:17

## 2017-07-14 RX ADMIN — LIDOCAINE HYDROCHLORIDE 2 ML: 10 INJECTION, SOLUTION EPIDURAL; INFILTRATION; INTRACAUDAL; PERINEURAL at 12:30

## 2017-07-14 RX ADMIN — NITROGLYCERIN 1 INCH: 20 OINTMENT TOPICAL at 11:44

## 2017-07-14 RX ADMIN — MIDAZOLAM HYDROCHLORIDE 2 MG: 1 INJECTION INTRAMUSCULAR; INTRAVENOUS at 12:17

## 2017-07-14 RX ADMIN — HEPARIN SODIUM 1000 UNITS: 200 INJECTION, SOLUTION INTRAVENOUS at 12:29

## 2017-07-14 RX ADMIN — VERAPAMIL HYDROCHLORIDE 2.5 MG: 2.5 INJECTION INTRAVENOUS at 12:42

## 2017-07-14 RX ADMIN — MIDAZOLAM HYDROCHLORIDE 2 MG: 1 INJECTION, SOLUTION INTRAMUSCULAR; INTRAVENOUS at 12:17

## 2017-07-14 RX ADMIN — NITROGLYCERIN 1 INCH: 20 OINTMENT TOPICAL at 06:04

## 2017-07-14 RX ADMIN — LIDOCAINE HYDROCHLORIDE 10 ML: 10 INJECTION INFILTRATION; PERINEURAL at 12:45

## 2017-07-14 RX ADMIN — IOPAMIDOL 20 ML: 755 INJECTION, SOLUTION INTRAVENOUS at 13:10

## 2017-07-14 RX ADMIN — HEPARIN SODIUM 2500 UNITS: 1000 INJECTION, SOLUTION INTRAVENOUS; SUBCUTANEOUS at 12:41

## 2017-07-14 RX ADMIN — METOPROLOL TARTRATE 25 MG: 25 TABLET ORAL at 06:04

## 2017-07-14 RX ADMIN — ATORVASTATIN CALCIUM 20 MG: 20 TABLET, FILM COATED ORAL at 22:37

## 2017-07-14 RX ADMIN — AMLODIPINE BESYLATE 10 MG: 5 TABLET ORAL at 07:29

## 2017-07-14 RX ADMIN — Medication 10 ML: at 22:37

## 2017-07-14 RX ADMIN — IOPAMIDOL 75 ML: 755 INJECTION, SOLUTION INTRAVENOUS at 13:11

## 2017-07-14 RX ADMIN — VERAPAMIL HYDROCHLORIDE 2.5 MG: 2.5 INJECTION, SOLUTION INTRAVENOUS at 12:42

## 2017-07-14 RX ADMIN — IOPAMIDOL 30 ML: 755 INJECTION, SOLUTION INTRAVENOUS at 13:06

## 2017-07-14 RX ADMIN — MIDAZOLAM HYDROCHLORIDE 1 MG: 1 INJECTION, SOLUTION INTRAMUSCULAR; INTRAVENOUS at 12:34

## 2017-07-14 RX ADMIN — FENTANYL CITRATE 25 MCG: 50 INJECTION, SOLUTION INTRAMUSCULAR; INTRAVENOUS at 14:39

## 2017-07-14 RX ADMIN — NITROGLYCERIN 1 INCH: 20 OINTMENT TOPICAL at 17:09

## 2017-07-14 RX ADMIN — NITROGLYCERIN 200 MCG: 5 INJECTION, SOLUTION INTRAVENOUS at 12:41

## 2017-07-14 RX ADMIN — FENTANYL CITRATE 25 MCG: 50 INJECTION, SOLUTION INTRAMUSCULAR; INTRAVENOUS at 13:54

## 2017-07-14 RX ADMIN — AMLODIPINE BESYLATE 10 MG: 5 TABLET ORAL at 09:29

## 2017-07-14 RX ADMIN — HYDROCODONE BITARTRATE AND ACETAMINOPHEN 1 TABLET: 5; 325 TABLET ORAL at 14:45

## 2017-07-14 RX ADMIN — LIDOCAINE HYDROCHLORIDE 10 ML: 10 INJECTION, SOLUTION INFILTRATION; PERINEURAL at 12:45

## 2017-07-14 RX ADMIN — FENTANYL CITRATE 25 MCG: 50 INJECTION, SOLUTION INTRAMUSCULAR; INTRAVENOUS at 12:34

## 2017-07-14 RX ADMIN — CARVEDILOL 12.5 MG: 12.5 TABLET, FILM COATED ORAL at 17:10

## 2017-07-14 RX ADMIN — Medication 10 ML: at 17:10

## 2017-07-14 RX ADMIN — HYDRALAZINE HYDROCHLORIDE 20 MG: 20 INJECTION INTRAMUSCULAR; INTRAVENOUS at 13:55

## 2017-07-14 RX ADMIN — ASPIRIN 81 MG: 81 TABLET, COATED ORAL at 08:52

## 2017-07-14 RX ADMIN — IOPAMIDOL 20 ML: 755 INJECTION, SOLUTION INTRAVENOUS at 13:09

## 2017-07-14 NOTE — H&P
History & Physical     Subjective:      Date of  Admission: 7/13/2017  5:59 PM     Admission type:Emergency    Luciano Stanford is a 49 y.o. male admitted for ACS (acute coronary syndrome) ([facility] Patient presents with one day h/o not feeling well. C/o generalized fatigue. Denies any chest pain, SOB, palpitations, headache, dizzy spells, N/V. Denies being out in the sun/heat. EKG revealed LVH. T wave inversions in the lateral leads. Troponin  was elevated at 0.99. He denies any previous cardiac history. Has h/o HTN, stopped taking medicine as his cousin who is a nurse told him lisinoprol was bad for him. Smokes 2 ppd, drinks 2-3 beers /day. Under stress as he lost his job 2 days ago. Creatinine slightly elevated. Patient Active Problem List    Diagnosis Date Noted    [facility] 07/13/2017    Essential hypertension 07/13/2017    Tobacco abuse 07/13/2017      None  Past Medical History:   Diagnosis Date    Diabetes ([facility]     Hypertension       Past Surgical History:   Procedure Laterality Date    HX KNEE REPLACEMENT      left     No Known Allergies   History reviewed. No pertinent family history. Current Facility-Administered Medications   Medication Dose Route Frequency    0.9% sodium chloride infusion  125 mL/hr IntraVENous CONTINUOUS    sodium chloride (NS) flush 5-10 mL  5-10 mL IntraVENous Q8H    sodium chloride (NS) flush 5-10 mL  5-10 mL IntraVENous PRN    nitroglycerin (NITROBID) 2 % ointment 1 Inch  1 Inch Topical Q6H    nitroglycerin (NITROSTAT) tablet 0.4 mg  0.4 mg SubLINGual PRN    metoprolol tartrate (LOPRESSOR) tablet 25 mg  25 mg Oral BID    [START ON 7/14/2017] aspirin delayed-release tablet 81 mg  81 mg Oral DAILY    enoxaparin (LOVENOX) injection 80 mg  1 mg/kg SubCUTAneous Q24H     No current outpatient prescriptions on file. Review of Symptoms:  A comprehensive review of systems was negative except for that written in the HPI. Subjective:      Physical Exam    Visit Vitals    BP (!) 149/103 (BP 1 Location: Left arm, BP Patient Position: At rest)    Pulse 97    Temp 98.3 °F (36.8 °C)    Resp 18    Ht 6' 5\" (1.956 m)    Wt 185 lb (83.9 kg)    SpO2 97%    BMI 21.94 kg/m2     General Appearance:  Well developed, well nourished,alert and oriented x 3, and individual in no acute distress. Ears/Nose/Mouth/Throat:   Hearing grossly normal.         Neck: Supple. Chest:   Lungs clear to auscultation bilaterally. Cardiovascular:  Regular rate and rhythm, S1, S2 normal, no murmur. Abdomen:   Soft, non-tender, bowel sounds are active. Extremities: No edema bilaterally. Skin: Warm and dry. Cardiographics    Telemetry: normal sinus rhythm  ECG:  normal sinus rhythm,  ischemic changes noted in lateral leads  Echocardiogram: Not done    Labs: Recent Results (from the past 24 hour(s))   EKG, 12 LEAD, INITIAL    Collection Time: 07/13/17  6:09 PM   Result Value Ref Range    Ventricular Rate 101 BPM    Atrial Rate 101 BPM    P-R Interval 148 ms    QRS Duration 102 ms    Q-T Interval 376 ms    QTC Calculation (Bezet) 487 ms    Calculated P Axis 11 degrees    Calculated R Axis -21 degrees    Calculated T Axis 139 degrees    Diagnosis       Sinus tachycardia with premature atrial complexes  Left ventricular hypertrophy with repolarization abnormality  Anterior infarct , age undetermined  Abnormal ECG  No previous ECGs available     CBC WITH AUTOMATED DIFF    Collection Time: 07/13/17  6:19 PM   Result Value Ref Range    WBC 9.0 4.1 - 11.1 K/uL    RBC 4.80 4. 10 - 5.70 M/uL    HGB 15.9 12.1 - 17.0 g/dL    HCT 45.0 36.6 - 50.3 %    MCV 93.8 80.0 - 99.0 FL    MCH 33.1 26.0 - 34.0 PG    MCHC 35.3 30.0 - 36.5 g/dL    RDW 12.4 11.5 - 14.5 %    PLATELET 461 961 - 291 K/uL    NEUTROPHILS 42 32 - 75 %    LYMPHOCYTES 44 12 - 49 %    MONOCYTES 5 5 - 13 %    EOSINOPHILS 7 0 - 7 %    BASOPHILS 2 (H) 0 - 1 %    ABS.  NEUTROPHILS 3.9 1.8 - 8.0 K/UL    ABS. LYMPHOCYTES 3.9 (H) 0.8 - 3.5 K/UL    ABS. MONOCYTES 0.4 0.0 - 1.0 K/UL    ABS. EOSINOPHILS 0.6 (H) 0.0 - 0.4 K/UL    ABS. BASOPHILS 0.1 0.0 - 0.1 K/UL   METABOLIC PANEL, COMPREHENSIVE    Collection Time: 07/13/17  6:19 PM   Result Value Ref Range    Sodium 139 136 - 145 mmol/L    Potassium 3.7 3.5 - 5.1 mmol/L    Chloride 110 (H) 97 - 108 mmol/L    CO2 21 21 - 32 mmol/L    Anion gap 8 5 - 15 mmol/L    Glucose 94 65 - 100 mg/dL    BUN 13 6 - 20 MG/DL    Creatinine 1.45 (H) 0.70 - 1.30 MG/DL    BUN/Creatinine ratio 9 (L) 12 - 20      GFR est AA >60 >60 ml/min/1.73m2    GFR est non-AA 51 (L) >60 ml/min/1.73m2    Calcium 8.6 8.5 - 10.1 MG/DL    Bilirubin, total 0.4 0.2 - 1.0 MG/DL    ALT (SGPT) 60 12 - 78 U/L    AST (SGOT) 56 (H) 15 - 37 U/L    Alk. phosphatase 108 45 - 117 U/L    Protein, total 7.8 6.4 - 8.2 g/dL    Albumin 3.6 3.5 - 5.0 g/dL    Globulin 4.2 (H) 2.0 - 4.0 g/dL    A-G Ratio 0.9 (L) 1.1 - 2.2     CK W/ REFLX CKMB    Collection Time: 07/13/17  6:19 PM   Result Value Ref Range     39 - 308 U/L   TROPONIN I    Collection Time: 07/13/17  6:19 PM   Result Value Ref Range    Troponin-I, Qt. 0.99 (H) <0.05 ng/mL   GLUCOSE, POC    Collection Time: 07/13/17  6:33 PM   Result Value Ref Range    Glucose (POC) 94 65 - 100 mg/dL    Performed by [employer]    ETHYL ALCOHOL    Collection Time: 07/13/17  7:07 PM   Result Value Ref Range    ALCOHOL(ETHYL),SERUM 176 (H) <10 MG/DL        Assessment:     Assessment:       Principal Problem:    ACS (acute coronary syndrome) ([facility] (7/13/2017)    Active Problems:    Essential hypertension (7/13/2017)      Tobacco abuse (7/13/2017)         Plan:     Principal Problem:    ACS (acute coronary syndrome) ([facility].) (7/13/2017)- will admit for further evaluation and management. Follow serial markers. ASA, betablocker, statin, LMWH. Cath/PCI in AM due to elevated troponin, abnormal EKG. IV fluids due to mild renal insufficiency.     Active Problems: Essential hypertension (7/13/2017)- treatment as above. Check echo. Tobacco abuse (7/13/2017)- advised to quit smoking.         [address] Carlito Yanes MD

## 2017-07-14 NOTE — CARDIO/PULMONARY
C/P Rehab Note:    Patient is a 49 y.o. Male admitted 7/13/17 for ACS. EKG revealed LVH. T wave inversions in lateral leads. Troponin elevated at 0.99. He denies previous cardiac hx. Active problems include HTN, ACS, Tobacco abuse. PMH:  DM  HTN  Hx. Knee Replacement. No EF from echo as of yet. Done yesterday. Plan is for Cardiac cath today. Attempted to meet with patient. He is sleeping soundly at this time. Information left at bedside re:  What to expect during coronary angiogram.

## 2017-07-14 NOTE — PROGRESS NOTES
0600 - NTP and 9am dose of AM metoprolol 25 given at this time due to BP elevation. Will monitor. 0650 - BP remains elevated, even higher now than in ER despite being given morning dose of NTP and AM metoprolol dose appr 45 +mins ago. New order received from Dr. Melquiades Trejo. Pt states he normally drinks appr 3 beers everyday. 0730 - Bedside report to Trev. NSR. Norvasc 10 mg PO given at this time per new order.

## 2017-07-14 NOTE — PROGRESS NOTES
0530 - TRANSFER - IN REPORT:    Verbal report received from fabi garcia(name) on Bean Serrato  being received from ER(unit) for routine progression of care      Report consisted of patients Situation, Background, Assessment and   Recommendations(SBAR). Information from the following report(s) SBAR, Kardex, ED Summary, MAR, Accordion, Recent Results and Med Rec Status was reviewed with the receiving nurse. Opportunity for questions and clarification was provided. Assessment will be completed upon patients arrival to unit and care assumed. NSR. BP elevation, med noncompliance hx.

## 2017-07-14 NOTE — DISCHARGE INSTRUCTIONS
[address] [address]  [phone]      CARDIOLOGY DISCHARGE INSTRUCTIONS      Patient ID:  Arnaldo Gonzalez  [id number]  53 y.o.  1960    Admit Date: 7/13/2017    Discharge Date: 7/15/2017     Admitting Physician: Conchis Jay MD     Discharge Physician: Dr. GARCES [facility]     Admission Diagnoses:   ACS (acute coronary syndrome) [facility])    Discharge Diagnoses:   Principal Problem:    ACS (acute coronary syndrome) ([facility] [address] (7/13/2017)    Active Problems:    Essential hypertension (7/13/2017)      Tobacco abuse (7/13/2017)      Dilated cardiomyopathy ([facility] [address] (7/14/2017)      S/P cardiac cath (7/14/2017)      Overview: 7/14/17: No significant CAD         Discharge Condition: Good    Cardiology Procedures this Admission:  Diagnostic left heart catheterization  EchoCardiogram    Disposition: home      Reference discharge instructions provided by nursing for diet and activity. Signed:  Stacey Wilkes NP  7/14/2017  3:26 PM    CARDIAC CATHETERIZATION/PCI DISCHARGE INSTRUCTIONS    It is normal to feel tired the first couple days. Take it easy and follow the physicians instructions. CHECK THE CATHETER INSERTION SITE DAILY:    You may shower 24 hours after the procedure, remove the bandage during showering. Wash with soap and water and pat dry. Gentle cleaning of the site with soap and water is sufficient, cover with a dry clean dressing or bandage. Do not apply creams or powders to the area. Do not sit in a bathtub or pool of water for 7 days or until wound has completely healed. Temporary bruising and discomfort is normal and may last a few weeks. You may have a  formation of a small lump at the site which may last up to 6 weeks. CALL THE PHYSICIANS:    If the site becomes red, swollen or feels warm to the touch  If there is bleeding or drainage or if there is unusual pain at the groin or down the leg.   If there is any bleeding, lie down, apply pressure or have someone apply pressure with a clean cloth until the bleeding stops. If the bleeding continues, call 911 to be transported to the hospital.  DO NOT DRIVE YOURSELF, [facility]. ACTIVITY:    For the first 24-48 hours or as instructed by the physician:  No lifting, pushing or pulling over 10 pounds and no straining the insertion site. Do not life grocery bags or the garbage can, do not run the vacuum  or  for 7 days. Start with short walks as in the hospital and gradually increase as tolerated each day. It is recommended to walk 30 minutes 5-7 days per week. Follow your physicians instructions on activity. Avoid walking outside in extremes of heat or cold. Walk inside when it is cold and windy or hot and humid. Things to keep in mind:  No driving for at least 24 hours, or as designated by your physician. Limit the number of times you go up and down the stairs  Take rests and pace yourself with activity. Be careful and do not strain with bowel movements. MEDICATIONS:    Take all medications as prescribed  Call your physician if you have any questions  Keep an updated list of your medications with you at all times and give a list to your physician and pharmacist        SIGNS AND SYMPTOMS:    Be cautious of symptoms of angina or recurrent symptoms such as chest discomfort, unusual shortness of breath or fatigue. These could be symptoms of restenosis, a new blockage or a heart attack. If your symptoms are relieved with rest it is still recommended that you notify your physician of recurrent chest pain or discomfort. FOR CHEST PAIN or symptoms of angina not relieved with rest:  If the discomfort is not relieved with rest, and you have been prescribed Nitroglycerin, take as directed (taken under the tongue, one at a time 5 minutes apart for a total of 3 doses). If the discomfort is not relieved after the 3rd nitroglycerin, call 911.   If you have not been prescribed Nitroglycerin  and your chest discomfort is not relieved with rest, call 911. AFTER CARE:    Follow up with your physician as instructed. Follow a heart healthy diet with proper portion control, daily stress management, daily exercise, blood pressure and cholesterol control , and smoking cessation. Radial Cardiac Catheterization/Angiography Discharge Instructions    It is normal to feel tired the first couple days. Take it easy and follow the physicians instructions. CHECK THE CATHETER INSERTION SITE DAILY:    Remove the wrist dressing 24 hours after the procedure. You may shower 24 hours after the procedure. Wash with soap and water and pat dry. Gentle cleaning of the site with soap and water is sufficient, cover with a dry clean dressing or bandage. Do not apply creams or powders to the area. No soaking the wrist for 3 days. Leave the puncture site open to air after 24 hours post-procedure. CALL THE PHYSICIANS:     If the site becomes red, swollen or feels warm to the touch  If there is bleeding or drainage or if there is unusual pain at the radial site. If there is any minor oozing, you may apply a band-aid and remove after 12 hours. If the bleeding continues, hold pressure with the middle finger against the puncture site and the thumb against the back of the wrist,call 911 to be transported to the hospital.  DO NOT DRIVE YOURSELF, [address] 205. ACTIVITY:   For the first 24 hours do not manipulate the wrist.  No lifting, pushing or pulling over 3-5 pounds with the affected wrist for 7 daysand no straining the insertion site. Do not life grocery bags or the garbage can, do not run the vacuum  or  for 7 days. Start with short walks as in the hospital and gradually increase as tolerated each day. It is recommended to walk 30 minutes 5-7 days per week.   Follow your physicians instructions on activity. Avoid walking outside in extremes of heat or cold. Walk inside when it is cold and windy or hot and humid. Things to keep in mind:  No driving for at least 24 hours, or as designated by your physician. Limit the number of times you go up and down the stairs  Take rests and pace yourself with activity. Be careful and do not strain with bowel movements. MEDICATIONS:    Take all medications as prescribed  Call your physician if you have any questions  Keep an updated list of your medications with you at all times and give a list to your physician and pharmacist    SIGNS AND SYMPTOMS:   Be cautious of symptoms of angina or recurrent symptoms such as chest discomfort, unusual shortness of breath or fatigue. These could be symptoms of restenosis, a new blockage or a heart attack. If your symptoms are relieved with rest it is still recommended that you notify your physician of recurrent chest pain or discomfort. For CHEST PAIN or symptoms of angina not relieved with rest:  If the discomfort is not relieved with rest, and you have been prescribed Nitroglycerin, take as directed (taken under the tongue, one at a time 5 minutes apart for a total of 3 doses). If the discomfort is not relieved after the 3rd nitroglycerin, call 911. If you have not been prescribed Nitroglycerin  and your chest discomfort is not relieved with rest, call 911. AFTER CARE:   Follow up with your physician as instructed. Follow a heart healthy diet with proper portion control, daily stress management, daily exercise, blood pressure and cholesterol control , and smoking cessation.

## 2017-07-14 NOTE — ED NOTES
Bedside shift change report given to July Ghosh RN (oncoming nurse) by Nita Iqbal RN (offgoing nurse).  Report included the following information SBAR, Kardex, ED Summary, Procedure Summary, MAR, Recent Results and Cardiac Rhythm SR.

## 2017-07-14 NOTE — PROCEDURES
LM-normal  LAD-normal   LCX-normal .   RCA-normal.   LV-dilated, LVEF 10-15%, LVEDP 18. Right radial access. No complications. EBL-minimal.   Specimen-none. Medical management.

## 2017-07-14 NOTE — PROGRESS NOTES
Marry Sparks is recovering post-procedure. R groin and R radial site dressing is CDI without swelling or bleeding with TR band and femstop in place. VSS with improving BP. Marry Sparks denies complaints at this time other than pain at the femstop and in his back from being in bed. I discussed Farzaneh Snow with patient and he states he is willing to consider it and learn more information. Approval for the Farzaneh Snow may not occur until early next week due to the time of the week. Key [facility] would be able to out out to patient's house and fit and educate him there if he were discharge prior to fitting.             Katerina Hammond NP  DNP, RN, AGACNP-BC

## 2017-07-14 NOTE — PROGRESS NOTES
Cardiology Progress Note      7/14/2017 11:03 AM    Admit Date: 7/13/2017    Admit Diagnosis: ACS (acute coronary syndrome) ([address]      Subjective:     Garrett Delgadillo has vague complaints. Denies any chest pain. Troponin remains elevated. Visit Vitals    BP (!) 177/106    Pulse 72    Temp 98.5 °F (36.9 °C)    Resp 16    Ht 6' 5\" (1.956 m)    Wt 185 lb (83.9 kg)    SpO2 96%    BMI 21.94 kg/m2       Current Facility-Administered Medications   Medication Dose Route Frequency    amLODIPine (NORVASC) tablet 10 mg  10 mg Oral DAILY    0.9% sodium chloride infusion  125 mL/hr IntraVENous CONTINUOUS    sodium chloride (NS) flush 5-10 mL  5-10 mL IntraVENous Q8H    sodium chloride (NS) flush 5-10 mL  5-10 mL IntraVENous PRN    nitroglycerin (NITROBID) 2 % ointment 1 Inch  1 Inch Topical Q6H    nitroglycerin (NITROSTAT) tablet 0.4 mg  0.4 mg SubLINGual PRN    metoprolol tartrate (LOPRESSOR) tablet 25 mg  25 mg Oral BID    aspirin delayed-release tablet 81 mg  81 mg Oral DAILY    atorvastatin (LIPITOR) tablet 20 mg  20 mg Oral QHS         Objective:      Physical Exam:  Visit Vitals    BP (!) 177/106    Pulse 72    Temp 98.5 °F (36.9 °C)    Resp 16    Ht 6' 5\" (1.956 m)    Wt 185 lb (83.9 kg)    SpO2 96%    BMI 21.94 kg/m2     General Appearance:  Well developed, well nourished,alert and oriented x 3, and individual in no acute distress. Ears/Nose/Mouth/Throat:   Hearing grossly normal.         Neck: Supple. Chest:   Lungs clear to auscultation bilaterally. Cardiovascular:  Regular rate and rhythm, S1, S2 normal, no murmur. Abdomen:   Soft, non-tender, bowel sounds are active. Extremities: No edema bilaterally. Skin: Warm and dry.                  Data Review:   Labs:  Recent Results (from the past 24 hour(s))   EKG, 12 LEAD, INITIAL    Collection Time: 07/13/17  6:09 PM   Result Value Ref Range    Ventricular Rate 101 BPM    Atrial Rate 101 BPM    P-R Interval 148 ms    QRS Duration 102 ms    Q-T Interval 376 ms    QTC Calculation (Bezet) 487 ms    Calculated P Axis 11 degrees    Calculated R Axis -21 degrees    Calculated T Axis 139 degrees    Diagnosis       Sinus tachycardia with premature atrial complexes  Left ventricular hypertrophy with repolarization abnormality  Anterior infarct , age undetermined  No previous ECGs available  Confirmed by Jesi Mcknight PAlexiVAlexi ([id number]) on 7/14/2017 10:49:48 AM     CBC WITH AUTOMATED DIFF    Collection Time: 07/13/17  6:19 PM   Result Value Ref Range    WBC 9.0 4.1 - 11.1 K/uL    RBC 4.80 4. 10 - 5.70 M/uL    HGB 15.9 12.1 - 17.0 g/dL    HCT 45.0 36.6 - 50.3 %    MCV 93.8 80.0 - 99.0 FL    MCH 33.1 26.0 - 34.0 PG    MCHC 35.3 30.0 - 36.5 g/dL    RDW 12.4 11.5 - 14.5 %    PLATELET 408 884 - 136 K/uL    NEUTROPHILS 42 32 - 75 %    LYMPHOCYTES 44 12 - 49 %    MONOCYTES 5 5 - 13 %    EOSINOPHILS 7 0 - 7 %    BASOPHILS 2 (H) 0 - 1 %    ABS. NEUTROPHILS 3.9 1.8 - 8.0 K/UL    ABS. LYMPHOCYTES 3.9 (H) 0.8 - 3.5 K/UL    ABS. MONOCYTES 0.4 0.0 - 1.0 K/UL    ABS. EOSINOPHILS 0.6 (H) 0.0 - 0.4 K/UL    ABS. BASOPHILS 0.1 0.0 - 0.1 K/UL   METABOLIC PANEL, COMPREHENSIVE    Collection Time: 07/13/17  6:19 PM   Result Value Ref Range    Sodium 139 136 - 145 mmol/L    Potassium 3.7 3.5 - 5.1 mmol/L    Chloride 110 (H) 97 - 108 mmol/L    CO2 21 21 - 32 mmol/L    Anion gap 8 5 - 15 mmol/L    Glucose 94 65 - 100 mg/dL    BUN 13 6 - 20 MG/DL    Creatinine 1.45 (H) 0.70 - 1.30 MG/DL    BUN/Creatinine ratio 9 (L) 12 - 20      GFR est AA >60 >60 ml/min/1.73m2    GFR est non-AA 51 (L) >60 ml/min/1.73m2    Calcium 8.6 8.5 - 10.1 MG/DL    Bilirubin, total 0.4 0.2 - 1.0 MG/DL    ALT (SGPT) 60 12 - 78 U/L    AST (SGOT) 56 (H) 15 - 37 U/L    Alk.  phosphatase 108 45 - 117 U/L    Protein, total 7.8 6.4 - 8.2 g/dL    Albumin 3.6 3.5 - 5.0 g/dL    Globulin 4.2 (H) 2.0 - 4.0 g/dL    A-G Ratio 0.9 (L) 1.1 - 2.2     CK W/ REFLX CKMB    Collection Time: 07/13/17  6:19 PM   Result Value Ref Range     39 - 308 U/L   TROPONIN I    Collection Time: 07/13/17  6:19 PM   Result Value Ref Range    Troponin-I, Qt. 0.99 (H) <0.05 ng/mL   GLUCOSE, POC    Collection Time: 07/13/17  6:33 PM   Result Value Ref Range    Glucose (POC) 94 65 - 100 mg/dL    Performed by Gloria Kamara    ETHYL ALCOHOL    Collection Time: 07/13/17  7:07 PM   Result Value Ref Range    ALCOHOL(ETHYL),SERUM 176 (H) <10 MG/DL   URINALYSIS W/ REFLEX CULTURE    Collection Time: 07/13/17  7:45 PM   Result Value Ref Range    Color DARK YELLOW      Appearance CLEAR CLEAR      Specific gravity 1.022 1.003 - 1.030      pH (UA) 5.0 5.0 - 8.0      Protein TRACE (A) NEG mg/dL    Glucose NEGATIVE  NEG mg/dL    Ketone TRACE (A) NEG mg/dL    Blood NEGATIVE  NEG      Urobilinogen 1.0 0.2 - 1.0 EU/dL    Nitrites NEGATIVE  NEG      Leukocyte Esterase NEGATIVE  NEG      WBC 0-4 0 - 4 /hpf    RBC 0-5 0 - 5 /hpf    Epithelial cells FEW FEW /lpf    Bacteria NEGATIVE  NEG /hpf    UA:UC IF INDICATED CULTURE NOT INDICATED BY UA RESULT CNI      CA Oxalate crystals 1+ (A) NEG   BILIRUBIN, CONFIRM    Collection Time: 07/13/17  7:45 PM   Result Value Ref Range    Bilirubin UA, confirm NEGATIVE  NEG     CK W/ CKMB & INDEX    Collection Time: 07/13/17  9:36 PM   Result Value Ref Range     39 - 308 U/L    CK - MB 2.2 <3.6 NG/ML    CK-MB Index 1.5 0 - 2.5     TROPONIN I    Collection Time: 07/13/17  9:36 PM   Result Value Ref Range    Troponin-I, Qt. 0.88 (H) <0.05 ng/mL   EKG, 12 LEAD, SUBSEQUENT    Collection Time: 07/13/17 10:28 PM   Result Value Ref Range    Ventricular Rate 75 BPM    Atrial Rate 75 BPM    P-R Interval 162 ms    QRS Duration 106 ms    Q-T Interval 434 ms    QTC Calculation (Bezet) 484 ms    Calculated P Axis 26 degrees    Calculated R Axis -34 degrees    Calculated T Axis -61 degrees    Diagnosis       Normal sinus rhythm  Possible Left atrial enlargement  Left axis deviation  Left ventricular hypertrophy  Cannot rule out Septal infarct , age undetermined  T wave abnormality, consider lateral ischemia    Confirmed by Fabi Marion, P.V. ([id number]) on 7/14/2017 57:49:75 AM     METABOLIC PANEL, BASIC    Collection Time: 07/14/17  3:36 AM   Result Value Ref Range    Sodium 140 136 - 145 mmol/L    Potassium 3.5 3.5 - 5.1 mmol/L    Chloride 109 (H) 97 - 108 mmol/L    CO2 21 21 - 32 mmol/L    Anion gap 10 5 - 15 mmol/L    Glucose 91 65 - 100 mg/dL    BUN 10 6 - 20 MG/DL    Creatinine 1.08 0.70 - 1.30 MG/DL    BUN/Creatinine ratio 9 (L) 12 - 20      GFR est AA >60 >60 ml/min/1.73m2    GFR est non-AA >60 >60 ml/min/1.73m2    Calcium 8.3 (L) 8.5 - 10.1 MG/DL   LIPID PANEL    Collection Time: 07/14/17  3:36 AM   Result Value Ref Range    LIPID PROFILE          Cholesterol, total 175 <200 MG/DL    Triglyceride 92 <150 MG/DL    HDL Cholesterol 112 MG/DL    LDL, calculated 44.6 0 - 100 MG/DL    VLDL, calculated 18.4 MG/DL    CHOL/HDL Ratio 1.6 0 - 5.0     CK W/ CKMB & INDEX    Collection Time: 07/14/17  3:36 AM   Result Value Ref Range     39 - 308 U/L    CK - MB 2.7 <3.6 NG/ML    CK-MB Index 1.9 0 - 2.5     TROPONIN I    Collection Time: 07/14/17  3:36 AM   Result Value Ref Range    Troponin-I, Qt. 0.99 (H) <0.05 ng/mL   EKG, 12 LEAD, INITIAL    Collection Time: 07/14/17  5:30 AM   Result Value Ref Range    Ventricular Rate 72 BPM    Atrial Rate 72 BPM    P-R Interval 154 ms    QRS Duration 108 ms    Q-T Interval 434 ms    QTC Calculation (Bezet) 475 ms    Calculated P Axis 34 degrees    Calculated R Axis -20 degrees    Calculated T Axis 90 degrees    Diagnosis       Normal sinus rhythm  Possible Left atrial enlargement  Left ventricular hypertrophy with repolarization abnormality  Cannot rule out Septal infarct , age undetermined    Confirmed by Fabi Marion, P.V. ([id number]) on 7/14/2017 10:54:40 AM     GLUCOSE, POC    Collection Time: 07/14/17  7:29 AM   Result Value Ref Range    Glucose (POC) 96 65 - 100 mg/dL    Performed by Humberto Landres        Telemetry: normal sinus rhythm      Assessment:     Principal Problem:    ACS (acute coronary syndrome) ([facility] [address] (7/13/2017)    Active Problems:    Essential hypertension (7/13/2017)      Tobacco abuse (7/13/2017)        Plan:     Adjust BP meds. Cath/PCI today. Echo pending. Counseled on medical compliance.     Satinder Campoverde MD

## 2017-07-14 NOTE — PROGRESS NOTES
Cardiac cath today revealed no CAD and a dilated nonischemic cardiomyopathy with EF of 10-15%. This places patient at high risk of sudden cardiac death. Will discuss Lifevest with patient.         Bridger Osborn NP DNP, RN, AGACNP-BC

## 2017-07-15 VITALS
WEIGHT: 185 LBS | OXYGEN SATURATION: 97 % | RESPIRATION RATE: 17 BRPM | BODY MASS INDEX: 21.84 KG/M2 | HEART RATE: 68 BPM | HEIGHT: 77 IN | TEMPERATURE: 97.8 F | DIASTOLIC BLOOD PRESSURE: 92 MMHG | SYSTOLIC BLOOD PRESSURE: 142 MMHG

## 2017-07-15 PROBLEM — I42.0 NONISCHEMIC DILATED CARDIOMYOPATHY (HCC): Status: ACTIVE | Noted: 2017-07-15

## 2017-07-15 PROBLEM — I42.8 NICM (NONISCHEMIC CARDIOMYOPATHY) (HCC): Status: ACTIVE | Noted: 2017-07-14

## 2017-07-15 PROCEDURE — 74011250637 HC RX REV CODE- 250/637: Performed by: INTERNAL MEDICINE

## 2017-07-15 RX ADMIN — Medication 10 ML: at 04:50

## 2017-07-15 RX ADMIN — ASPIRIN 81 MG: 81 TABLET, COATED ORAL at 09:09

## 2017-07-15 RX ADMIN — CARVEDILOL 12.5 MG: 12.5 TABLET, FILM COATED ORAL at 09:09

## 2017-07-15 RX ADMIN — LISINOPRIL 10 MG: 5 TABLET ORAL at 09:09

## 2017-07-15 RX ADMIN — AMLODIPINE BESYLATE 10 MG: 5 TABLET ORAL at 09:09

## 2017-07-15 NOTE — PROGRESS NOTES
7/15/2017 11:26 AM    Admit Date: 7/13/2017    Admit Diagnosis:   ACS (acute coronary syndrome) ([facility] [address]    Subjective:     Charlie Wilson denies chest pain or shortness of breath. Ambulating in halls with lifevest training complete. Current Facility-Administered Medications   Medication Dose Route Frequency    amLODIPine (NORVASC) tablet 10 mg  10 mg Oral DAILY    carvedilol (COREG) tablet 12.5 mg  12.5 mg Oral BID WITH MEALS    lisinopril (PRINIVIL, ZESTRIL) tablet 10 mg  10 mg Oral DAILY    fentaNYL citrate (PF) injection 25 mcg  25 mcg IntraVENous Q4H PRN    sodium chloride (NS) flush 5-10 mL  5-10 mL IntraVENous Q8H    sodium chloride (NS) flush 5-10 mL  5-10 mL IntraVENous PRN    HYDROcodone-acetaminophen (NORCO) 5-325 mg per tablet 1 Tab  1 Tab Oral Q4H PRN    sodium chloride (NS) flush 5-10 mL  5-10 mL IntraVENous Q8H    sodium chloride (NS) flush 5-10 mL  5-10 mL IntraVENous PRN    acetaminophen (TYLENOL) tablet 650 mg  650 mg Oral Q4H PRN    naloxone (NARCAN) injection 0.4 mg  0.4 mg IntraVENous PRN    hydrALAZINE (APRESOLINE) 20 mg/mL injection 20 mg  20 mg IntraVENous Q6H PRN    0.9% sodium chloride infusion  125 mL/hr IntraVENous CONTINUOUS    sodium chloride (NS) flush 5-10 mL  5-10 mL IntraVENous Q8H    sodium chloride (NS) flush 5-10 mL  5-10 mL IntraVENous PRN    nitroglycerin (NITROBID) 2 % ointment 1 Inch  1 Inch Topical Q6H    nitroglycerin (NITROSTAT) tablet 0.4 mg  0.4 mg SubLINGual PRN    aspirin delayed-release tablet 81 mg  81 mg Oral DAILY    atorvastatin (LIPITOR) tablet 20 mg  20 mg Oral QHS         Objective:      Physical Exam:    Visit Vitals    BP (!) 142/92    Pulse 68    Temp 97.8 °F (36.6 °C)    Resp 17    Ht 6' 5\" (1.956 m)    Wt 185 lb (83.9 kg)    SpO2 97%    BMI 21.94 kg/m2     Gen:  NAD  Mental Status - Alert. General Appearance - Not in acute distress.    Chest and Lung Exam   Inspection: Accessory muscles - No use of accessory muscles in breathing. Auscultation:   Breath sounds: - Normal.   Cardiovascular   Inspection: Jugular vein - Bilateral - Inspection Normal.   Palpation/Percussion:   Apical Impulse: - Normal.   Auscultation: Rhythm - Regular. Heart Sounds - S1 WNL and S2 WNL. No S3 or S4. Murmurs & Other Heart Sounds: Auscultation of the heart reveals - No Murmurs. Peripheral Vascular   Upper Extremity: Inspection - Bilateral - No Cyanotic nailbeds or Digital clubbing. right radial intact, uncomplicated   Lower Extremity:   Palpation: Edema - Bilateral - No edema. Abdomen:   Soft, non-tender, bowel sounds are active. Neuro: A&O times 3, CN and motor grossly WNL    Data Review:   Recent Labs      07/13/17   1819   WBC  9.0   HGB  15.9   HCT  45.0   PLT  247     Recent Labs      07/14/17   0336  07/13/17   1819   NA  140  139   K  3.5  3.7   CL  109*  110*   CO2  21  21   GLU  91  94   BUN  10  13   CREA  1.08  1.45*   CA  8.3*  8.6   ALB   --   3.6   TBILI   --   0.4   SGOT   --   56*   ALT   --   60       Recent Labs      07/14/17   0336  07/13/17   2136  07/13/17   1819   TROIQ  0.99*  0.88*  0.99*   CPK  140  142   --    CKMB  2.7  2.2   --          Intake/Output Summary (Last 24 hours) at 07/15/17 1126  Last data filed at 07/14/17 2232   Gross per 24 hour   Intake              240 ml   Output                0 ml   Net              240 ml        Telemetry: normal sinus rhythm    Assessment:     Principal Problem:    Nonischemic dilated cardiomyopathy ([facility] [address] (7/15/2017)    Active Problems:    Essential hypertension (7/13/2017)      Tobacco abuse (7/13/2017)      S/P cardiac cath (7/14/2017)      Overview: 7/14/17: No significant CAD         Plan:     Dilated NICM/ malignant HTN:  Continue coreg, ACEI, amlodipine, ASA, statin. Counseled on med compliance. Feeling much better. Home today after life-vest placement to f/u with [facility] within 1 week. Tobacco:  Counseled on smoking cessation- 5 minutes spent.      Financial:  Case management to see re: medication costs and application for disability. Discussed life vest with 2 representatives. Greater than 30 minutes spent on DC planning and praparation/ instructions.

## 2017-07-15 NOTE — PROGRESS NOTES
Lifevest representative called this nurse and stated she will be in to see patient in the morning to fit him for Lifevest.

## 2017-07-15 NOTE — PROGRESS NOTES
I met with pt in room, female visitor present and pt stated I could talk with her in room. She did not identify self. Pt concerned with the cost of his medications. I explained that [facility] does not have medication assistance and that the patient assistance programs do not usually offer assistance with copay amounts. Pt's d/c medications were called into [address] I phoned and spoke with pharmacy representative who stated the medications were ready for . Once I provided the correct insurance information the cost of the medications was $38.89. Pt stated that he could afford this price. I also provided him with information on disability, the phone number to call to apply and the website. We discussed the process a little. He stated he had friends who were on disability and he would talk with them for guidance as well. Pt asked for number of [employer].   Number placed on AVS.    Anselmo Rodriguez. [address]

## 2017-07-15 NOTE — PROGRESS NOTES
Pt's female visitor at bedside was inquiring when pt will be able to go back to work and if he is going to be abl;e to work outside in the heat. 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 Female visitor was also inquiring if patient can get his medications upon discharge without having to pay anything. She stated that he is inbetween paychecks and he is not able to afford even his co-pay at this time. Informed patient and his female visitor that these concerns will be communicated to his physician. Pt and visitor verbalized understanding.

## 2017-07-15 NOTE — PROGRESS NOTES
Discharge instructions reviewed with patient and family. Allowed adequate time to ask questions, all questions answered. Printed copy of AVS given to patient. All belongings gathered, IV and tele discontinued. Transported via wheelchair by volunteer/PCT to main entrance and into care of family.

## 2017-07-15 NOTE — CARDIO/PULMONARY
C/P Rehab Note:     Patient is a 49 y.o. Male admitted 7/13/17 for ACS. EKG revealed LVH. T wave inversions in lateral leads. Troponin elevated at 0.99. He denies previous cardiac hx. Active problems include HTN, ACS, Tobacco abuse.     PMH:  DM  HTN  Hx. Knee Replacement. Patient had cardiac cath 7/14/17, which revealed no CAD and a dilated nonischemic cardiomyopathy with EF of 10-15%. Cardiology discussed need for Lifevest with patient. Met with patient who is sitting up in room. Printed material given and discussed re: heart healthy habits, and post cardiac catheterization instructions. Discussed post catheterization restrictions including no lifting, no tub baths and no straining for 7 days. Also discussed what to do if bleeding or bruising at the cath insertion site is observed. Reviewed the cardiac diet (low NA/fat/CHOL), the importance of medication compliance, monitoring for any unusual signs & symptoms and when to call the doctor. Also reviewed post-cath care for wrist as patient had radial insertion of catheter also.       Patient is aware of need for lifevest and will discuss this further with his Cardiologist.

## 2017-08-08 NOTE — DISCHARGE SUMMARY
Cardiology Discharge Summary     Patient ID:  Shyann Mcfadden  [id number]  10 y.o.  1960    Admit Date: 7/13/2017    Discharge Date: 7/15/2017    Admitting Physician: Miladys Elizalde MD     Discharge Physician: Miladys Elizalde MD    Admission Diagnoses: ACS (acute coronary syndrome) [facility])    Discharge Diagnoses: Principal Problem:    Nonischemic dilated cardiomyopathy ([facility] (7/15/2017)    Active Problems:    Essential hypertension (7/13/2017)      Tobacco abuse (7/13/2017)      S/P cardiac cath (7/14/2017)      Overview: 7/14/17: No significant CAD         Discharge Condition: Stable    Cardiology Procedures this Admission:  Diagnostic left heart catheterization    Hospital Course: Shyann Mcfadden is a 49 y.o. male admitted for ACS (acute coronary syndrome) ([facility] Patient presented with one day h/o not feeling well. C/o generalized fatigue. Denied any chest pain, SOB, palpitations, headache, dizzy spells, N/V. Denied being out in the sun/heat. EKG revealed LVH. T wave inversions in the lateral leads. Troponin  was elevated at 0.99. He denies any previous cardiac history. Has h/o HTN, stopped taking medicine as his cousin who is a nurse told him lisinoprol was bad for him. Smokes 2 ppd, drinks 2-3 beers /day. Under stress as he lost his job 2 days ago. Creatinine slightly elevated. He underwent cardiac cath due to elevated troponin. This revealed an EF of 10-15%. He was started on appropriate treatment. lifevest was placed. Discharged home in a stable condition. Discussed medical compliance. Consults: None    Discharge Exam:     Visit Vitals    BP (!) 142/92    Pulse 68    Temp 97.8 °F (36.6 °C)    Resp 17    Ht 6' 5\" (1.956 m)    Wt 185 lb (83.9 kg)    SpO2 97%    BMI 21.94 kg/m2     General Appearance:  Well developed, well nourished,alert and oriented x 3, and individual in no acute distress. Ears/Nose/Mouth/Throat:   Hearing grossly normal.         Neck: Supple. Chest:   Lungs clear to auscultation bilaterally. Cardiovascular:  Regular rate and rhythm, S1, S2 normal, no murmur. Abdomen:   Soft, non-tender, bowel sounds are active. Extremities: No edema bilaterally. Skin: Warm and dry. Disposition: home    Patient Instructions:   Cannot display discharge medications since this patient is not currently admitted. Referenced discharge instructions provided by nursing for diet and activity. Follow-up with Dr. Brooke Li in 3 days.      Signed:Carmelo Thakur MD  8/8/2017  3:21 PM

## 2017-12-08 ENCOUNTER — HOSPITAL ENCOUNTER (EMERGENCY)
Age: 57
Discharge: HOME | End: 2017-12-08
Attending: EMERGENCY MEDICINE
Payer: SELF-PAY

## 2017-12-08 ENCOUNTER — APPOINTMENT (OUTPATIENT)
Dept: CT IMAGING | Age: 57
End: 2017-12-08
Attending: EMERGENCY MEDICINE
Payer: SELF-PAY

## 2017-12-08 VITALS
HEART RATE: 107 BPM | BODY MASS INDEX: 23.14 KG/M2 | SYSTOLIC BLOOD PRESSURE: 173 MMHG | OXYGEN SATURATION: 95 % | DIASTOLIC BLOOD PRESSURE: 92 MMHG | HEIGHT: 76 IN | TEMPERATURE: 98.3 F | WEIGHT: 190 LBS | RESPIRATION RATE: 18 BRPM

## 2017-12-08 DIAGNOSIS — I16.0 HYPERTENSIVE URGENCY: ICD-10-CM

## 2017-12-08 DIAGNOSIS — Z91.14 NONCOMPLIANCE WITH MEDICATION REGIMEN: ICD-10-CM

## 2017-12-08 DIAGNOSIS — J03.90 ACUTE TONSILLITIS, UNSPECIFIED ETIOLOGY: Primary | ICD-10-CM

## 2017-12-08 LAB
ANION GAP BLD CALC-SCNC: 17 MMOL/L (ref 5–15)
BASOPHILS # BLD: 0.1 K/UL (ref 0–0.1)
BASOPHILS NFR BLD: 1 % (ref 0–1)
BUN BLD-MCNC: 7 MG/DL (ref 9–20)
CA-I BLD-MCNC: 1.11 MMOL/L (ref 1.12–1.32)
CHLORIDE BLD-SCNC: 104 MMOL/L (ref 98–107)
CO2 BLD-SCNC: 23 MMOL/L (ref 21–32)
CREAT BLD-MCNC: 1.2 MG/DL (ref 0.6–1.3)
CRP SERPL-MCNC: 0.77 MG/DL (ref 0–0.6)
EOSINOPHIL # BLD: 0.2 K/UL (ref 0–0.4)
EOSINOPHIL NFR BLD: 2 % (ref 0–7)
ERYTHROCYTE [DISTWIDTH] IN BLOOD BY AUTOMATED COUNT: 12.1 % (ref 11.5–14.5)
GLUCOSE BLD-MCNC: 126 MG/DL (ref 65–100)
HCT VFR BLD AUTO: 49.6 % (ref 36.6–50.3)
HCT VFR BLD CALC: 47 % (ref 36.6–50.3)
HGB BLD-MCNC: 16 GM/DL (ref 12.1–17)
HGB BLD-MCNC: 17.8 G/DL (ref 12.1–17)
LYMPHOCYTES # BLD: 2.8 K/UL (ref 0.8–3.5)
LYMPHOCYTES NFR BLD: 21 % (ref 12–49)
MCH RBC QN AUTO: 33.3 PG (ref 26–34)
MCHC RBC AUTO-ENTMCNC: 35.9 G/DL (ref 30–36.5)
MCV RBC AUTO: 92.9 FL (ref 80–99)
MONOCYTES # BLD: 1 K/UL (ref 0–1)
MONOCYTES NFR BLD: 7 % (ref 5–13)
NEUTS SEG # BLD: 9.2 K/UL (ref 1.8–8)
NEUTS SEG NFR BLD: 69 % (ref 32–75)
PLATELET # BLD AUTO: 286 K/UL (ref 150–400)
POTASSIUM BLD-SCNC: 3.6 MMOL/L (ref 3.5–5.1)
RBC # BLD AUTO: 5.34 M/UL (ref 4.1–5.7)
S PYO AG THROAT QL: NEGATIVE
SERVICE CMNT-IMP: ABNORMAL
SODIUM BLD-SCNC: 139 MMOL/L (ref 136–145)
WBC # BLD AUTO: 13.2 K/UL (ref 4.1–11.1)

## 2017-12-08 PROCEDURE — 85025 COMPLETE CBC W/AUTO DIFF WBC: CPT | Performed by: EMERGENCY MEDICINE

## 2017-12-08 PROCEDURE — 80047 BASIC METABLC PNL IONIZED CA: CPT

## 2017-12-08 PROCEDURE — 96361 HYDRATE IV INFUSION ADD-ON: CPT

## 2017-12-08 PROCEDURE — 74011000258 HC RX REV CODE- 258: Performed by: EMERGENCY MEDICINE

## 2017-12-08 PROCEDURE — 96375 TX/PRO/DX INJ NEW DRUG ADDON: CPT

## 2017-12-08 PROCEDURE — 74011250636 HC RX REV CODE- 250/636: Performed by: EMERGENCY MEDICINE

## 2017-12-08 PROCEDURE — 86140 C-REACTIVE PROTEIN: CPT | Performed by: EMERGENCY MEDICINE

## 2017-12-08 PROCEDURE — 36415 COLL VENOUS BLD VENIPUNCTURE: CPT | Performed by: EMERGENCY MEDICINE

## 2017-12-08 PROCEDURE — 96374 THER/PROPH/DIAG INJ IV PUSH: CPT

## 2017-12-08 PROCEDURE — 99284 EMERGENCY DEPT VISIT MOD MDM: CPT

## 2017-12-08 PROCEDURE — 74011636320 HC RX REV CODE- 636/320: Performed by: EMERGENCY MEDICINE

## 2017-12-08 PROCEDURE — 87880 STREP A ASSAY W/OPTIC: CPT

## 2017-12-08 PROCEDURE — 74011250637 HC RX REV CODE- 250/637: Performed by: EMERGENCY MEDICINE

## 2017-12-08 PROCEDURE — 70491 CT SOFT TISSUE NECK W/DYE: CPT

## 2017-12-08 PROCEDURE — 87070 CULTURE OTHR SPECIMN AEROBIC: CPT | Performed by: EMERGENCY MEDICINE

## 2017-12-08 RX ORDER — DEXAMETHASONE SODIUM PHOSPHATE 10 MG/ML
10 INJECTION INTRAMUSCULAR; INTRAVENOUS
Status: COMPLETED | OUTPATIENT
Start: 2017-12-08 | End: 2017-12-08

## 2017-12-08 RX ORDER — ONDANSETRON 2 MG/ML
4 INJECTION INTRAMUSCULAR; INTRAVENOUS
Status: COMPLETED | OUTPATIENT
Start: 2017-12-08 | End: 2017-12-08

## 2017-12-08 RX ORDER — HYDROMORPHONE HYDROCHLORIDE 2 MG/ML
1 INJECTION, SOLUTION INTRAMUSCULAR; INTRAVENOUS; SUBCUTANEOUS ONCE
Status: COMPLETED | OUTPATIENT
Start: 2017-12-08 | End: 2017-12-08

## 2017-12-08 RX ORDER — SODIUM CHLORIDE 0.9 % (FLUSH) 0.9 %
10 SYRINGE (ML) INJECTION
Status: COMPLETED | OUTPATIENT
Start: 2017-12-08 | End: 2017-12-08

## 2017-12-08 RX ORDER — HYDROCODONE BITARTRATE AND ACETAMINOPHEN 5; 325 MG/1; MG/1
1-2 TABLET ORAL
Qty: 20 TAB | Refills: 0 | Status: SHIPPED | OUTPATIENT
Start: 2017-12-08 | End: 2018-04-15

## 2017-12-08 RX ORDER — HYDRALAZINE HYDROCHLORIDE 20 MG/ML
20 INJECTION INTRAMUSCULAR; INTRAVENOUS
Status: COMPLETED | OUTPATIENT
Start: 2017-12-08 | End: 2017-12-08

## 2017-12-08 RX ORDER — PREDNISONE 20 MG/1
60 TABLET ORAL DAILY
Qty: 12 TAB | Refills: 0 | Status: SHIPPED | OUTPATIENT
Start: 2017-12-08 | End: 2017-12-12

## 2017-12-08 RX ORDER — CLINDAMYCIN HYDROCHLORIDE 300 MG/1
300 CAPSULE ORAL 3 TIMES DAILY
Qty: 30 CAP | Refills: 0 | Status: SHIPPED | OUTPATIENT
Start: 2017-12-08 | End: 2017-12-18

## 2017-12-08 RX ORDER — KETOROLAC TROMETHAMINE 30 MG/ML
30 INJECTION, SOLUTION INTRAMUSCULAR; INTRAVENOUS
Status: COMPLETED | OUTPATIENT
Start: 2017-12-08 | End: 2017-12-08

## 2017-12-08 RX ORDER — NAPROXEN 500 MG/1
500 TABLET ORAL 2 TIMES DAILY WITH MEALS
Qty: 30 TAB | Refills: 0 | Status: SHIPPED | OUTPATIENT
Start: 2017-12-08 | End: 2018-04-15

## 2017-12-08 RX ADMIN — IOPAMIDOL 100 ML: 612 INJECTION, SOLUTION INTRAVENOUS at 06:09

## 2017-12-08 RX ADMIN — HYDROMORPHONE HYDROCHLORIDE 1 MG: 2 INJECTION INTRAMUSCULAR; INTRAVENOUS; SUBCUTANEOUS at 05:15

## 2017-12-08 RX ADMIN — SODIUM CHLORIDE 100 ML: 900 INJECTION, SOLUTION INTRAVENOUS at 06:09

## 2017-12-08 RX ADMIN — SODIUM CHLORIDE 1000 ML: 900 INJECTION, SOLUTION INTRAVENOUS at 05:15

## 2017-12-08 RX ADMIN — HYDRALAZINE HYDROCHLORIDE 20 MG: 20 INJECTION INTRAMUSCULAR; INTRAVENOUS at 06:23

## 2017-12-08 RX ADMIN — DEXAMETHASONE SODIUM PHOSPHATE 10 MG: 10 INJECTION, SOLUTION INTRAMUSCULAR; INTRAVENOUS at 05:15

## 2017-12-08 RX ADMIN — KETOROLAC TROMETHAMINE 30 MG: 30 INJECTION, SOLUTION INTRAMUSCULAR at 05:15

## 2017-12-08 RX ADMIN — Medication 10 ML: at 06:09

## 2017-12-08 RX ADMIN — ONDANSETRON 4 MG: 2 INJECTION INTRAMUSCULAR; INTRAVENOUS at 05:15

## 2017-12-08 NOTE — DISCHARGE INSTRUCTIONS
We hope that we have addressed all of your medical concerns. The examination and treatment you received in the Emergency Department were for an emergent problem and were not intended as complete care. It is important that you follow up with your healthcare provider(s) for ongoing care. If your symptoms worsen or do not improve as expected, and you are unable to reach your usual health care provider(s), you should return to the Emergency Department. Today's healthcare is undergoing tremendous change, and patient satisfaction surveys are one of the many tools to assess the quality of medical care. You may receive a survey from the [employer] regarding your experience in the Emergency Department. I hope that your experience has been completely positive, particularly the medical care that I provided. As such, please participate in the survey; anything less than excellent does not meet my expectations or intentions. [facility] and [facility] participate in nationally recognized quality of care measures. If your blood pressure is greater than 120/80, as reported below, we urge that you seek medical care to address the potential of high blood pressure, commonly known as hypertension. Hypertension can be hereditary or can be caused by certain medical conditions, pain, stress, or \"white coat syndrome. \"       Please make an appointment with your health care provider(s) for follow up of your Emergency Department visit. VITALS:   Patient Vitals for the past 8 hrs:   Temp Pulse Resp BP SpO2   12/08/17 0700 - - - (!) 173/92 95 %   12/08/17 0645 - - - (!) 185/98 96 %   12/08/17 0630 - - - (!) 204/115 94 %   12/08/17 0601 - - - (!) 220/120 -   12/08/17 0450 98.3 °F (36.8 °C) (!) 107 18 (!) 229/136 98 %          Thank you for allowing us to provide you with medical care today. We realize that you have many choices for your emergency care needs. Please choose us in the future for any continued health care needs. Liya Ricks MD    [address] [facility].   Office: [phone]            Recent Results (from the past 24 hour(s))   CBC WITH AUTOMATED DIFF    Collection Time: 12/08/17  5:08 AM   Result Value Ref Range    WBC 13.2 (H) 4.1 - 11.1 K/uL    RBC 5.34 4.10 - 5.70 M/uL    HGB 17.8 (H) 12.1 - 17.0 g/dL    HCT 49.6 36.6 - 50.3 %    MCV 92.9 80.0 - 99.0 FL    MCH 33.3 26.0 - 34.0 PG    MCHC 35.9 30.0 - 36.5 g/dL    RDW 12.1 11.5 - 14.5 %    PLATELET 468 652 - 416 K/uL    NEUTROPHILS 69 32 - 75 %    LYMPHOCYTES 21 12 - 49 %    MONOCYTES 7 5 - 13 %    EOSINOPHILS 2 0 - 7 %    BASOPHILS 1 0 - 1 %    ABS. NEUTROPHILS 9.2 (H) 1.8 - 8.0 K/UL    ABS. LYMPHOCYTES 2.8 0.8 - 3.5 K/UL    ABS. MONOCYTES 1.0 0.0 - 1.0 K/UL    ABS. EOSINOPHILS 0.2 0.0 - 0.4 K/UL    ABS.  BASOPHILS 0.1 0.0 - 0.1 K/UL   C REACTIVE PROTEIN, QT    Collection Time: 12/08/17  5:08 AM   Result Value Ref Range    C-Reactive protein 0.77 (H) 0.00 - 0.60 mg/dL   POC CHEM8    Collection Time: 12/08/17  5:09 AM   Result Value Ref Range    Calcium, ionized (POC) 1.11 (L) 1.12 - 1.32 MMOL/L    Sodium (POC) 139 136 - 145 MMOL/L    Potassium (POC) 3.6 3.5 - 5.1 MMOL/L    Chloride (POC) 104 98 - 107 MMOL/L    CO2 (POC) 23 21 - 32 MMOL/L    Anion gap (POC) 17 (H) 5 - 15 mmol/L    Glucose (POC) 126 (H) 65 - 100 MG/DL    BUN (POC) 7 (L) 9 - 20 MG/DL    Creatinine (POC) 1.2 0.6 - 1.3 MG/DL    GFRAA, POC >60 >60 ml/min/1.73m2    GFRNA, POC >60 >60 ml/min/1.73m2    Hemoglobin (POC) 16.0 12.1 - 17.0 GM/DL    Hematocrit (POC) 47 36.6 - 50.3 %    Comment Notified RN or MD immediately by      POC GROUP A STREP    Collection Time: 12/08/17  5:20 AM   Result Value Ref Range    Group A strep (POC) NEGATIVE  NEG         Ct Neck Soft Tissue W Cont    Result Date: 12/8/2017  EXAM:  CT NECK SOFT TISSUE W CONT INDICATION:  Sore throat x2 days with painful swallowing and choking sensation. COMPARISON: None. CONTRAST: 100 mL of Isovue-300. TECHNIQUE: Multislice helical CT was performed from the mid calvarium to the aortic arch during uneventful rapid bolus intravenous contrast administration. Contiguous 2.5 mm axial images were reconstructed and lung and soft tissue windows were generated. Coronal and sagittal reformations were generated. CT dose reduction was achieved through use of a standardized protocol tailored for this examination and automatic exposure control for dose modulation. FINDINGS: No mass or adenopathy is identified within the neck. Left tonsillar pillars are mildly prominent. The carotid and jugular vessels enhance normally. The thyroid gland, submandibular salivary glands and parotid glands are normal. No nasopharyngeal, pharyngeal or laryngeal mass is identified. There is fluid filling the left vallecula. No abnormalities are identified in the visualized portions of the brain or orbits. The visualized mastoid air cells and paranasal sinuses are clear. The visualized portions of the lung apices show centrilobular and paraseptal emphysematous change but otherwise are clear. IMPRESSION: Fluid filling the left vallecula of uncertain clinical significance. Mildly prominent left tonsillar pillars. Emphysema. Normal exam otherwise. Acute High Blood Pressure: Care Instructions  Your Care Instructions    Acute high blood pressure is very high blood pressure. It's a serious problem. Very high blood pressure can damage your brain, heart, eyes, and kidneys. You may have been given medicines to lower your blood pressure. You may have gotten them as pills or through a needle in one of your veins. This is called an IV. And maybe you were given other medicines too. These can be needed when high blood pressure causes other problems. To keep your blood pressure at a lower level, you may need to make healthy lifestyle changes.  And you will probably need to take medicines. Be sure to follow up with your doctor about your blood pressure and what you can do about it. Follow-up care is a key part of your treatment and safety. Be sure to make and go to all appointments, and call your doctor if you are having problems. It's also a good idea to know your test results and keep a list of the medicines you take. How can you care for yourself at home? · See your doctor as often as he or she recommends. This is to make sure your blood pressure is under control. You will probably go at least 2 times a year. But it may be more often at first.  · Take your blood pressure medicine exactly as prescribed. You may take one or more types. They include diuretics, beta-blockers, ACE inhibitors, calcium channel blockers, and angiotensin II receptor blockers. Call your doctor if you think you are having a problem with your medicine. · If you take blood pressure medicine, talk to your doctor before you take decongestants or anti-inflammatory medicine, such as ibuprofen. These can raise blood pressure. · Learn how to check your blood pressure at home. Check it often. · Ask your doctor if it's okay to drink alcohol. · Talk to your doctor about lifestyle changes that can help blood pressure. These include being active and not smoking. When should you call for help? Call 911 anytime you think you may need emergency care. This may mean having symptoms that suggest that your blood pressure is causing a serious heart or blood vessel problem. Your blood pressure may be over 180/110. ? For example, call 911 if:  ? · You have symptoms of a heart attack. These may include:  ¨ Chest pain or pressure, or a strange feeling in the chest.  ¨ Sweating. ¨ Shortness of breath. ¨ Nausea or vomiting. ¨ Pain, pressure, or a strange feeling in the back, neck, jaw, or upper belly or in one or both shoulders or arms. ¨ Lightheadedness or sudden weakness. ¨ A fast or irregular heartbeat.    ? · You have symptoms of a stroke. These may include:  ¨ Sudden numbness, tingling, weakness, or loss of movement in your face, arm, or leg, especially on only one side of your body. ¨ Sudden vision changes. ¨ Sudden trouble speaking. ¨ Sudden confusion or trouble understanding simple statements. ¨ Sudden problems with walking or balance. ¨ A sudden, severe headache that is different from past headaches. ? · You have severe back or belly pain. ?Do not wait until your blood pressure comes down on its own. Get help right away. ?Call your doctor now or seek immediate care if:  ? · Your blood pressure is much higher than normal (such as 180/110 or higher), but you don't have symptoms. ? · You think high blood pressure is causing symptoms, such as:  ¨ Severe headache. ¨ Blurry vision. ? Watch closely for changes in your health, and be sure to contact your doctor if:  ? · Your blood pressure measures 140/90 or higher at least 2 times. That means the top number is 140 or higher or the bottom number is 90 or higher, or both. ? · You think you may be having side effects from your blood pressure medicine. ? · Your blood pressure is usually normal, but it goes above normal at least 2 times. Where can you learn more? Go to http://steve-marsha.info/. Enter K972 in the search box to learn more about \"Acute High Blood Pressure: Care Instructions. \"  Current as of: September 21, 2016  Content Version: 11.4  © 2813-9284 [employer]. Care instructions adapted under license by [employer] (which disclaims liability or warranty for this information). If you have questions about a medical condition or this instruction, always ask your healthcare professional. [place] [address] any warranty or liability for your use of this information.          Tonsillitis: Care Instructions  Your Care Instructions    Tonsillitis is an infection of the tonsils that is caused by bacteria or a virus. The tonsils are in the back of the throat and are part of the immune system. Tonsillitis typically lasts from a few days up to a couple of weeks. Tonsillitis caused by a virus goes away on its own. Tonsillitis caused by the bacteria that causes strep throat is treated with antibiotics. You and your doctor may consider surgery to remove the tonsils (tonsillectomy) if you have serious complications or repeat infections. Follow-up care is a key part of your treatment and safety. Be sure to make and go to all appointments, and call your doctor if you are having problems. It's also a good idea to know your test results and keep a list of the medicines you take. How can you care for yourself at home? · If your doctor prescribed antibiotics, take them as directed. Do not stop taking them just because you feel better. You need to take the full course of antibiotics. · Gargle with warm salt water. This helps reduce swelling and relieve discomfort. Gargle once an hour with 1 teaspoon of salt mixed in 8 fluid ounces of warm water. · Take an over-the-counter pain medicine, such as acetaminophen (Tylenol), ibuprofen (Advil, Motrin), or naproxen (Aleve). Be safe with medicines. Read and follow all instructions on the label. No one younger than 20 should take aspirin. It has been linked to Reye syndrome, a serious illness. · Be careful when taking over-the-counter cold or flu medicines and Tylenol at the same time. Many of these medicines have acetaminophen, which is Tylenol. Read the labels to make sure that you are not taking more than the recommended dose. Too much acetaminophen (Tylenol) can be harmful. · Try an over-the-counter throat spray to relieve throat pain. · Drink plenty of fluids. Fluids may help soothe an irritated throat. Drink warm or cool liquids (whichever feels better). These include tea, soup, and juice. · Do not smoke, and avoid secondhand smoke. Smoking can make tonsillitis worse.  If you need help quitting, talk to your doctor about stop-smoking programs and medicines. These can increase your chances of quitting for good. · Use a vaporizer or humidifier to add moisture to your bedroom. Follow the directions for cleaning the machine. When should you call for help? Call your doctor now or seek immediate medical care if:  ? · Your pain gets worse on one side of your throat. ? · You have a new or higher fever. ? · You notice changes in your voice. ? · You have trouble opening your mouth. ? · You have any trouble breathing. ? · You have much more trouble swallowing. ? · You have a fever with a stiff neck or a severe headache. ? · You are sensitive to light or feel very sleepy or confused. ? Watch closely for changes in your health, and be sure to contact your doctor if:  ? · You do not get better after 2 days. Where can you learn more? Go to http://steve-marsha.info/. Enter E079 in the search box to learn more about \"Tonsillitis: Care Instructions. \"  Current as of: May 12, 2017  Content Version: 11.4  © 6836-1732 [employer]. Care instructions adapted under license by [employer] (which disclaims liability or warranty for this information). If you have questions about a medical condition or this instruction, always ask your healthcare professional. [address] any warranty or liability for your use of this information.

## 2017-12-08 NOTE — ED PROVIDER NOTES
HPI Comments: The patient is a 61-year-old male, who presents to the ED with the complaint of sore throat x2 days with painful swallowing and choking sensation. The patient denies any fever, cough, congestion, nausea, vomiting, diarrhea, constipation, dysuria, hematuria, dizziness, weakness, numbness, sick contact, skin rash, prior history of the same. Patient is a 56 y.o. male presenting with sore throat. Sore Throat           Past Medical History:   Diagnosis Date    Diabetes ([address]     Hypertension        Past Surgical History:   Procedure Laterality Date    HX KNEE REPLACEMENT      left         No family history on file. Social History     Social History    Marital status: SINGLE     Spouse name: N/A    Number of children: N/A    Years of education: N/A     Occupational History    Not on file. Social History Main Topics    Smoking status: Current Every Day Smoker     Packs/day: 1.00    Smokeless tobacco: Never Used    Alcohol use Yes      Comment: 2 beers     Drug use: No    Sexual activity: Not on file     Other Topics Concern    Not on file     Social History Narrative         ALLERGIES: Review of patient's allergies indicates no known allergies. Review of Systems   HENT: Positive for sore throat. Vitals:    12/08/17 0450   BP: (!) 229/136   Pulse: (!) 107   Resp: 18   Temp: 98.3 °F (36.8 °C)   SpO2: 98%   Weight: 86.2 kg (190 lb)   Height: 6' 4\" (1.93 m)            Physical Exam   Nursing note and vitals reviewed. CONSTITUTIONAL: Well-appearing; well-nourished; in no apparent distress  HEAD: Normocephalic; atraumatic  EYES: PERRL; EOM intact; conjunctiva and sclera are clear bilaterally. ENT: No rhinorrhea; pharyngeal erythema without any tonsillar hypertrophy; mucous membranes pink/moist, no exudate. NECK: Supple; non-tender; no cervical lymphadenopathy  CARD: Normal S1, S2; no murmurs, rubs, or gallops. Regular rate and rhythm.   RESP: Normal respiratory effort; breath sounds clear and equal bilaterally; no wheezes, rhonchi, or rales. ABD: Normal bowel sounds; non-distended; non-tender; no palpable organomegaly, no masses, no bruits. Back Exam: Normal inspection; no vertebral point tenderness, no CVA tenderness. Normal range of motion. EXT: Normal ROM in all four extremities; non-tender to palpation; no swelling or deformity; distal pulses are normal, no edema. SKIN: Warm; dry; no rash. NEURO:Alert and oriented x 3, coherent, LILLIAM-XII grossly intact, sensory and motor are non-focal.        MDM  Number of Diagnoses or Management Options  Acute tonsillitis, unspecified etiology:   Hypertensive urgency:   Diagnosis management comments: Assessment: 74-year-old male, who presents to the ED with sore throat, and painful swallowing. The patient is also moderately hypertensive and does not take his medication as prescribed. The patient need evaluation for strep pharyngitis, retropharyngeal abscess, pharyngo-tonsillitis. Plan: lab/ IV fluid/ steroid/ analgesia/ CT soft tissue neck/ hydralazine/ serial exam/ Monitor and Reevaluate.          Amount and/or Complexity of Data Reviewed  Clinical lab tests: ordered and reviewed  Tests in the radiology section of CPT®: ordered and reviewed  Tests in the medicine section of CPT®: reviewed and ordered  Discussion of test results with the performing providers: yes  Decide to obtain previous medical records or to obtain history from someone other than the patient: yes  Obtain history from someone other than the patient: yes  Review and summarize past medical records: yes  Discuss the patient with other providers: yes  Independent visualization of images, tracings, or specimens: yes    Risk of Complications, Morbidity, and/or Mortality  Presenting problems: moderate  Diagnostic procedures: moderate  Management options: moderate      ED Course       Procedures      Progress Note:   Pt has been reexamined by Shon Travis MD. Pt is feeling much better. Symptoms have improved. All available results have been reviewed with pt and any available family. Pt understands sx, dx, and tx in ED. Care plan has been outlined and questions have been answered. The patient was also given p.o. Challenge that he tolerated well. He had a good response, hydralazine. Pt is ready to go home. Will send home on pharyngo tonsillitis/ hypertensive urgency/ noncompliance with medication instructions. Prescription of clindamycin, Norco naproxen. . Outpatient referral with PCP as needed. Written by Riana Hinton MD,6:25 AM    .   .

## 2017-12-08 NOTE — ED NOTES
Pt resting comfortably at this time. IVF bolus completed infusing at this time. Remains alert and oriented times 4. Respirations even and unlabored with symmetrical chest rise.

## 2017-12-08 NOTE — ED NOTES
Pt given four prescriptions at d/c. Instructed to follow up with PCP and encouraged to return to ED for any worsening of symptoms. PIV removed intact. Ambulatory with steady gait. VSS.

## 2017-12-08 NOTE — LETTER
[address] 
[address] [facility] [id number] 
[phone] Work/School Note Date: 12/8/2017 To Whom It May concern: 
 
Mary Ferreira was seen and treated today in the emergency room by the following provider(s): 
Attending Provider: Rajan Price MD. Mary Ferreira may return to work on 12/10/17. Sincerely, Rajan Price MD

## 2017-12-10 LAB
BACTERIA SPEC CULT: NORMAL
SERVICE CMNT-IMP: NORMAL

## 2018-04-15 ENCOUNTER — APPOINTMENT (OUTPATIENT)
Dept: GENERAL RADIOLOGY | Age: 58
DRG: 305 | End: 2018-04-15
Attending: EMERGENCY MEDICINE
Payer: SELF-PAY

## 2018-04-15 ENCOUNTER — HOSPITAL ENCOUNTER (INPATIENT)
Age: 58
LOS: 2 days | Discharge: HOME | DRG: 305 | End: 2018-04-17
Attending: EMERGENCY MEDICINE | Admitting: HOSPITALIST
Payer: SELF-PAY

## 2018-04-15 DIAGNOSIS — R77.8 ELEVATED TROPONIN: ICD-10-CM

## 2018-04-15 DIAGNOSIS — R07.9 CHEST PAIN, UNSPECIFIED TYPE: Primary | ICD-10-CM

## 2018-04-15 DIAGNOSIS — Z91.199 NON-COMPLIANCE: ICD-10-CM

## 2018-04-15 PROBLEM — R06.02 SHORTNESS OF BREATH: Status: ACTIVE | Noted: 2018-04-15

## 2018-04-15 PROBLEM — I16.0 HYPERTENSIVE URGENCY: Status: ACTIVE | Noted: 2018-04-15

## 2018-04-15 LAB
ALBUMIN SERPL-MCNC: 3.7 G/DL (ref 3.5–5)
ALBUMIN/GLOB SERPL: 0.7 {RATIO} (ref 1.1–2.2)
ALP SERPL-CCNC: 121 U/L (ref 45–117)
ALT SERPL-CCNC: 101 U/L (ref 12–78)
AMPHET UR QL SCN: NEGATIVE
ANION GAP SERPL CALC-SCNC: 10 MMOL/L (ref 5–15)
AST SERPL-CCNC: 76 U/L (ref 15–37)
BARBITURATES UR QL SCN: NEGATIVE
BASOPHILS # BLD: 0.1 K/UL (ref 0–0.1)
BASOPHILS NFR BLD: 1 % (ref 0–1)
BENZODIAZ UR QL: NEGATIVE
BILIRUB SERPL-MCNC: 0.9 MG/DL (ref 0.2–1)
BUN SERPL-MCNC: 13 MG/DL (ref 6–20)
BUN/CREAT SERPL: 10 (ref 12–20)
CALCIUM SERPL-MCNC: 8.7 MG/DL (ref 8.5–10.1)
CANNABINOIDS UR QL SCN: NEGATIVE
CHLORIDE SERPL-SCNC: 103 MMOL/L (ref 97–108)
CK SERPL-CCNC: 125 U/L (ref 39–308)
CO2 SERPL-SCNC: 24 MMOL/L (ref 21–32)
COCAINE UR QL SCN: NEGATIVE
CREAT SERPL-MCNC: 1.28 MG/DL (ref 0.7–1.3)
D DIMER PPP FEU-MCNC: 2.96 MG/L FEU (ref 0–0.65)
DIFFERENTIAL METHOD BLD: NORMAL
DRUG SCRN COMMENT,DRGCM: NORMAL
EOSINOPHIL # BLD: 0.2 K/UL (ref 0–0.4)
EOSINOPHIL NFR BLD: 2 % (ref 0–7)
ERYTHROCYTE [DISTWIDTH] IN BLOOD BY AUTOMATED COUNT: 11.7 % (ref 11.5–14.5)
GLOBULIN SER CALC-MCNC: 5 G/DL (ref 2–4)
GLUCOSE BLD STRIP.AUTO-MCNC: 101 MG/DL (ref 65–100)
GLUCOSE BLD STRIP.AUTO-MCNC: 122 MG/DL (ref 65–100)
GLUCOSE SERPL-MCNC: 93 MG/DL (ref 65–100)
HCT VFR BLD AUTO: 46.9 % (ref 36.6–50.3)
HGB BLD-MCNC: 16.7 G/DL (ref 12.1–17)
IMM GRANULOCYTES # BLD: 0 K/UL (ref 0–0.04)
IMM GRANULOCYTES NFR BLD AUTO: 0 % (ref 0–0.5)
LYMPHOCYTES # BLD: 2.2 K/UL (ref 0.8–3.5)
LYMPHOCYTES NFR BLD: 22 % (ref 12–49)
MCH RBC QN AUTO: 33.8 PG (ref 26–34)
MCHC RBC AUTO-ENTMCNC: 35.6 G/DL (ref 30–36.5)
MCV RBC AUTO: 94.9 FL (ref 80–99)
METHADONE UR QL: NEGATIVE
MONOCYTES # BLD: 0.5 K/UL (ref 0–1)
MONOCYTES NFR BLD: 5 % (ref 5–13)
NEUTS SEG # BLD: 6.8 K/UL (ref 1.8–8)
NEUTS SEG NFR BLD: 69 % (ref 32–75)
NRBC # BLD: 0 K/UL (ref 0–0.01)
NRBC BLD-RTO: 0 PER 100 WBC
OPIATES UR QL: NEGATIVE
PCP UR QL: NEGATIVE
PLATELET # BLD AUTO: 232 K/UL (ref 150–400)
PMV BLD AUTO: 10.2 FL (ref 8.9–12.9)
POTASSIUM SERPL-SCNC: 3.8 MMOL/L (ref 3.5–5.1)
PROT SERPL-MCNC: 8.7 G/DL (ref 6.4–8.2)
RBC # BLD AUTO: 4.94 M/UL (ref 4.1–5.7)
SERVICE CMNT-IMP: ABNORMAL
SERVICE CMNT-IMP: ABNORMAL
SODIUM SERPL-SCNC: 137 MMOL/L (ref 136–145)
TROPONIN I SERPL-MCNC: 0.69 NG/ML
WBC # BLD AUTO: 9.9 K/UL (ref 4.1–11.1)

## 2018-04-15 PROCEDURE — 74011250637 HC RX REV CODE- 250/637: Performed by: EMERGENCY MEDICINE

## 2018-04-15 PROCEDURE — 74011250636 HC RX REV CODE- 250/636: Performed by: HOSPITALIST

## 2018-04-15 PROCEDURE — 96375 TX/PRO/DX INJ NEW DRUG ADDON: CPT

## 2018-04-15 PROCEDURE — 80307 DRUG TEST PRSMV CHEM ANLYZR: CPT | Performed by: EMERGENCY MEDICINE

## 2018-04-15 PROCEDURE — 82962 GLUCOSE BLOOD TEST: CPT

## 2018-04-15 PROCEDURE — 84484 ASSAY OF TROPONIN QUANT: CPT | Performed by: EMERGENCY MEDICINE

## 2018-04-15 PROCEDURE — 71046 X-RAY EXAM CHEST 2 VIEWS: CPT

## 2018-04-15 PROCEDURE — 74011250637 HC RX REV CODE- 250/637: Performed by: HOSPITALIST

## 2018-04-15 PROCEDURE — 93005 ELECTROCARDIOGRAM TRACING: CPT

## 2018-04-15 PROCEDURE — 96374 THER/PROPH/DIAG INJ IV PUSH: CPT

## 2018-04-15 PROCEDURE — 65660000000 HC RM CCU STEPDOWN

## 2018-04-15 PROCEDURE — 96376 TX/PRO/DX INJ SAME DRUG ADON: CPT

## 2018-04-15 PROCEDURE — 85025 COMPLETE CBC W/AUTO DIFF WBC: CPT | Performed by: EMERGENCY MEDICINE

## 2018-04-15 PROCEDURE — 36415 COLL VENOUS BLD VENIPUNCTURE: CPT | Performed by: EMERGENCY MEDICINE

## 2018-04-15 PROCEDURE — 74011250636 HC RX REV CODE- 250/636: Performed by: EMERGENCY MEDICINE

## 2018-04-15 PROCEDURE — 82550 ASSAY OF CK (CPK): CPT | Performed by: EMERGENCY MEDICINE

## 2018-04-15 PROCEDURE — 85379 FIBRIN DEGRADATION QUANT: CPT | Performed by: EMERGENCY MEDICINE

## 2018-04-15 PROCEDURE — 80053 COMPREHEN METABOLIC PANEL: CPT | Performed by: EMERGENCY MEDICINE

## 2018-04-15 PROCEDURE — 74011000250 HC RX REV CODE- 250: Performed by: EMERGENCY MEDICINE

## 2018-04-15 PROCEDURE — 99285 EMERGENCY DEPT VISIT HI MDM: CPT

## 2018-04-15 PROCEDURE — 74011250637 HC RX REV CODE- 250/637: Performed by: INTERNAL MEDICINE

## 2018-04-15 PROCEDURE — 93306 TTE W/DOPPLER COMPLETE: CPT | Performed by: HOSPITALIST

## 2018-04-15 RX ORDER — LORAZEPAM 2 MG/ML
1 INJECTION INTRAMUSCULAR
Status: DISCONTINUED | OUTPATIENT
Start: 2018-04-15 | End: 2018-04-15

## 2018-04-15 RX ORDER — ONDANSETRON 2 MG/ML
4 INJECTION INTRAMUSCULAR; INTRAVENOUS
Status: DISCONTINUED | OUTPATIENT
Start: 2018-04-15 | End: 2018-04-17 | Stop reason: HOSPADM

## 2018-04-15 RX ORDER — ATORVASTATIN CALCIUM 10 MG/1
5 TABLET, FILM COATED ORAL
Status: DISCONTINUED | OUTPATIENT
Start: 2018-04-15 | End: 2018-04-17 | Stop reason: HOSPADM

## 2018-04-15 RX ORDER — CARVEDILOL 12.5 MG/1
25 TABLET ORAL 2 TIMES DAILY WITH MEALS
Status: DISCONTINUED | OUTPATIENT
Start: 2018-04-15 | End: 2018-04-17 | Stop reason: HOSPADM

## 2018-04-15 RX ORDER — ENOXAPARIN SODIUM 100 MG/ML
40 INJECTION SUBCUTANEOUS EVERY 24 HOURS
Status: DISCONTINUED | OUTPATIENT
Start: 2018-04-15 | End: 2018-04-17 | Stop reason: HOSPADM

## 2018-04-15 RX ORDER — INSULIN LISPRO 100 [IU]/ML
INJECTION, SOLUTION INTRAVENOUS; SUBCUTANEOUS
Status: DISCONTINUED | OUTPATIENT
Start: 2018-04-15 | End: 2018-04-17 | Stop reason: HOSPADM

## 2018-04-15 RX ORDER — LISINOPRIL 10 MG/1
10 TABLET ORAL DAILY
Status: DISCONTINUED | OUTPATIENT
Start: 2018-04-15 | End: 2018-04-15 | Stop reason: SDUPTHER

## 2018-04-15 RX ORDER — LISINOPRIL 20 MG/1
40 TABLET ORAL DAILY
Status: DISCONTINUED | OUTPATIENT
Start: 2018-04-15 | End: 2018-04-17 | Stop reason: HOSPADM

## 2018-04-15 RX ORDER — DEXTROSE 50 % IN WATER (D50W) INTRAVENOUS SYRINGE
12.5-25 AS NEEDED
Status: DISCONTINUED | OUTPATIENT
Start: 2018-04-15 | End: 2018-04-17 | Stop reason: HOSPADM

## 2018-04-15 RX ORDER — LORAZEPAM 2 MG/ML
0.5 INJECTION INTRAMUSCULAR ONCE
Status: COMPLETED | OUTPATIENT
Start: 2018-04-15 | End: 2018-04-15

## 2018-04-15 RX ORDER — ASPIRIN 81 MG/1
81 TABLET ORAL DAILY
Status: DISCONTINUED | OUTPATIENT
Start: 2018-04-15 | End: 2018-04-17 | Stop reason: HOSPADM

## 2018-04-15 RX ORDER — MAGNESIUM SULFATE 100 %
4 CRYSTALS MISCELLANEOUS AS NEEDED
Status: DISCONTINUED | OUTPATIENT
Start: 2018-04-15 | End: 2018-04-17 | Stop reason: HOSPADM

## 2018-04-15 RX ORDER — SODIUM CHLORIDE 0.9 % (FLUSH) 0.9 %
5-10 SYRINGE (ML) INJECTION EVERY 8 HOURS
Status: DISCONTINUED | OUTPATIENT
Start: 2018-04-15 | End: 2018-04-17 | Stop reason: HOSPADM

## 2018-04-15 RX ORDER — LORAZEPAM 2 MG/ML
INJECTION INTRAMUSCULAR
Status: DISCONTINUED
Start: 2018-04-15 | End: 2018-04-15

## 2018-04-15 RX ORDER — IBUPROFEN 200 MG
1 TABLET ORAL EVERY 24 HOURS
Status: DISCONTINUED | OUTPATIENT
Start: 2018-04-15 | End: 2018-04-17 | Stop reason: HOSPADM

## 2018-04-15 RX ORDER — CARVEDILOL 12.5 MG/1
12.5 TABLET ORAL 2 TIMES DAILY WITH MEALS
Status: DISCONTINUED | OUTPATIENT
Start: 2018-04-15 | End: 2018-04-15 | Stop reason: SDUPTHER

## 2018-04-15 RX ORDER — LORAZEPAM 2 MG/ML
0.5 INJECTION INTRAMUSCULAR
Status: COMPLETED | OUTPATIENT
Start: 2018-04-15 | End: 2018-04-15

## 2018-04-15 RX ORDER — METOPROLOL TARTRATE 5 MG/5ML
5 INJECTION INTRAVENOUS
Status: COMPLETED | OUTPATIENT
Start: 2018-04-15 | End: 2018-04-15

## 2018-04-15 RX ORDER — GUAIFENESIN 100 MG/5ML
162 LIQUID (ML) ORAL
Status: COMPLETED | OUTPATIENT
Start: 2018-04-15 | End: 2018-04-15

## 2018-04-15 RX ORDER — SODIUM CHLORIDE 0.9 % (FLUSH) 0.9 %
5-10 SYRINGE (ML) INJECTION AS NEEDED
Status: DISCONTINUED | OUTPATIENT
Start: 2018-04-15 | End: 2018-04-17 | Stop reason: HOSPADM

## 2018-04-15 RX ORDER — HYDROCODONE BITARTRATE AND ACETAMINOPHEN 5; 325 MG/1; MG/1
1-2 TABLET ORAL
Status: DISCONTINUED | OUTPATIENT
Start: 2018-04-15 | End: 2018-04-17 | Stop reason: HOSPADM

## 2018-04-15 RX ORDER — LORAZEPAM 1 MG/1
1 TABLET ORAL
Status: DISCONTINUED | OUTPATIENT
Start: 2018-04-15 | End: 2018-04-15

## 2018-04-15 RX ORDER — AMLODIPINE BESYLATE 5 MG/1
10 TABLET ORAL DAILY
Status: DISCONTINUED | OUTPATIENT
Start: 2018-04-15 | End: 2018-04-17 | Stop reason: HOSPADM

## 2018-04-15 RX ADMIN — AMLODIPINE BESYLATE 10 MG: 5 TABLET ORAL at 12:40

## 2018-04-15 RX ADMIN — NITROGLYCERIN 1 INCH: 20 OINTMENT TOPICAL at 12:11

## 2018-04-15 RX ADMIN — LISINOPRIL 40 MG: 20 TABLET ORAL at 12:39

## 2018-04-15 RX ADMIN — LORAZEPAM 0.5 MG: 2 INJECTION INTRAMUSCULAR; INTRAVENOUS at 08:27

## 2018-04-15 RX ADMIN — ASPIRIN 81 MG: 81 TABLET, COATED ORAL at 12:40

## 2018-04-15 RX ADMIN — Medication 10 ML: at 21:55

## 2018-04-15 RX ADMIN — ENOXAPARIN SODIUM 40 MG: 40 INJECTION SUBCUTANEOUS at 12:39

## 2018-04-15 RX ADMIN — ATORVASTATIN CALCIUM 5 MG: 10 TABLET, FILM COATED ORAL at 21:54

## 2018-04-15 RX ADMIN — METOPROLOL TARTRATE 5 MG: 5 INJECTION INTRAVENOUS at 08:41

## 2018-04-15 RX ADMIN — CARVEDILOL 25 MG: 12.5 TABLET, FILM COATED ORAL at 16:41

## 2018-04-15 RX ADMIN — Medication 10 ML: at 16:41

## 2018-04-15 RX ADMIN — ASPIRIN 81 MG 162 MG: 81 TABLET ORAL at 08:24

## 2018-04-15 RX ADMIN — LORAZEPAM 0.5 MG: 2 INJECTION INTRAMUSCULAR; INTRAVENOUS at 09:06

## 2018-04-15 NOTE — IP AVS SNAPSHOT
[address] [facility] [address] [place], [place] [id number] Patient: Roseanna Bishop MRN: [id number] ZUE:[id number]/46/[id number] About your hospitalization You were admitted on:  April 15, 2018 You last received care in the:  [facility] 2N MED SURG You were discharged on:  April 17, 2018 Why you were hospitalized Your primary diagnosis was:  Hypertensive Urgency Your diagnoses also included:  Shortness Of Breath, Tobacco Abuse, Nonischemic Dilated Cardiomyopathy (Hcc), Hypokalemia, Chronic Systolic Heart Failure (Hcc) Follow-up Information Follow up With Details Comments Contact Info Kallie Mauro MD  follow up with cardiology in 2-3 weeks [place] [address] [address] [place] [address] 
[phone] [address], [address] On 4/23/2018 Hospital f/u new PCP appointment (financial screening) on Monday, 4/23/28 @ 1:35 p.m followed by PCP @ 2:15 p.m. Patient needs to provide photo ID, proof of income and d/c summary. [address] [place] [address] 
[phone] Your Scheduled Appointments Monday April 23, 2018  1:35 PM EDT ROUTINE CARE with [address] [place] [address] [address] [place] [facility] ([facility]) [address] [place] [address] [phone]  
[phone] Monday April 23, 2018  2:15 PM EDT Office Visit with Radha Hernandez MD  
[address] [place] [facility] [address] [place] [address]) [address] [place], [place] [address] [phone]  
[phone] Discharge Orders None A check padmini indicates which time of day the medication should be taken. My Medications START taking these medications Instructions Each Dose to Equal  
 Morning Noon Evening Bedtime  
 carvedilol 25 mg tablet Commonly known as:  Juan Dodd Your last dose was: Your next dose is: Take 1 Tab by mouth two (2) times daily (with meals). Indications: chronic heart failure  25 mg  
    
   
   
   
 lisinopril 40 mg tablet Commonly known as:  Bennie Bowie Your last dose was: Your next dose is: Take 1 Tab by mouth daily. 40 mg CONTINUE taking these medications Instructions Each Dose to Equal  
 Morning Noon Evening Bedtime  
 amLODIPine 10 mg tablet Commonly known as:  Niecy Peters Your last dose was: Your next dose is: Take 1 Tab by mouth daily. Indications: hypertension 10 mg  
    
   
   
   
  
 aspirin delayed-release 81 mg tablet Your last dose was: Your next dose is: Take 1 Tab by mouth daily. 81 mg  
    
   
   
   
  
 atorvastatin 10 mg tablet Commonly known as:  LIPITOR Your last dose was: Your next dose is: Take 0.5 Tabs by mouth nightly. 5 mg Where to Get Your Medications Information on where to get these meds will be given to you by the nurse or doctor. ! Ask your nurse or doctor about these medications  
  amLODIPine 10 mg tablet  
 aspirin delayed-release 81 mg tablet  
 atorvastatin 10 mg tablet  
 carvedilol 25 mg tablet  
 lisinopril 40 mg tablet Discharge Instructions Discharge Instructions PATIENT ID: Keke Pena MRN: [id number] YOB: 1960 DATE OF ADMISSION: 4/15/2018  6:28 AM   
DATE OF DISCHARGE: 4/17/2018 PRIMARY CARE PROVIDER: None ATTENDING PHYSICIAN: Mayte Cao* DISCHARGING PROVIDER: Jose Ramon Lee NP To contact this individual call [phone] and ask the  to page. If unavailable ask to be transferred the Adult Hospitalist Department. DISCHARGE DIAGNOSES Hypertensive Urgency, Chronic Systolic Heart Failure, Acute Kidney Injury CONSULTATIONS: IP CONSULT TO CARDIOLOGY 
IP CONSULT TO CARDIOLOGY 
IP CONSULT TO HOSPITALIST 
IP CONSULT TO PSYCHIATRY PROCEDURES/SURGERIES: * No surgery found * PENDING TEST RESULTS:  
At the time of discharge the following test results are still pending: none FOLLOW UP APPOINTMENTS:  
Follow-up Information Follow up With Details Comments Contact Lani Barker MD  follow up with cardiology in 2-3 weeks Jose Cruz  [address] [facility] 
[phone] ADDITIONAL CARE RECOMMENDATIONS:  
1. You have been prescribed the following new medications: 
-Coreg and Lisinopril (blood pressure medications) to help with your heart failure.  
-Lipitor (cholesterol lowering medication) and ASA for heart attack prevention/heart health. -Norvasc (blood pressure medication). See below for more information to include common side effects. 2. Follow up with cardiology and primary care. 3. Stop smoking. DIET: Cardiac ACTIVITY: as tolerated WOUND CARE: none EQUIPMENT needed: none DISCHARGE MEDICATIONS: 
 Amlodipine (Hypertenipine-2.5, Norvasc) - (By mouth) Why this medicine is used:  
Treats high blood pressure and angina (chest pain). Contact a nurse or doctor right away if you have: · Blistering, peeling, red skin rash · Chest pain that may spread to your arms, jaw, back, or neck · Trouble breathing · Swelling in your hands, ankles, legs · Unusual sweating, faintness Common side effects: · Tiredness, drowsiness · Headache © 2017 [facility] [place] Information is for End User's use only and may not be sold, redistributed or otherwise used for commercial purposes. Atorvastatin (Lipitor) - (By mouth) Why this medicine is used:  
Treats high cholesterol and triglyceride levels. Reduces the risk of angina, stroke, heart attack, or certain heart and blood vessel problems. Contact a nurse or doctor right away if you have: · Severe headache, confusion, trouble speaking · Dark urine or pale stools · Yellow skin or eyes · Nausea, vomiting, loss of appetite, stomach pain · Muscle pain, tenderness, or weakness; unusual tiredness Common side effects: 
· Diarrhea · Joint pain © 2017 [facility] Information is for End User's use only and may not be sold, redistributed or otherwise used for commercial purposes. Lisinopril (Prinivil, Zestril) - (By mouth) Why this medicine is used:  
Treats high blood pressure and heart failure. Contact a nurse or doctor right away if you have: · Blistering, peeling, red skin rash · Change in how much or how often you urinate · Confusion, weakness, uneven heartbeat, trouble breathing · Lightheadedness, dizziness, fainting · Numbness or tingling in your hands, feet, or lips Common side effects: · Dry cough © 2017 [facility] Information is for End User's use only and may not be sold, redistributed or otherwise used for commercial purposes. Carvedilol (Coreg, Coreg CR, Hypertenevide-12.5) - (By mouth) Why this medicine is used:  
Treats high blood pressure and heart failure. Contact a nurse or doctor right away if you have: 
· Change in how much or how often you urinate · Leg pain when you walk, legs and feet that feel cold or numb · Lightheadedness, dizziness, fainting · Rapid weight gain, swelling in your hands, ankles, or feet Common side effects: · Mild dizziness · Tiredness · Trouble having sex · Diarrhea © 2017 [facility] Information is for End User's use only and may not be sold, redistributed or otherwise used for commercial purposes. See Medication Reconciliation Form · It is important that you take the medication exactly as they are prescribed. · Keep your medication in the bottles provided by the pharmacist and keep a list of the medication names, dosages, and times to be taken in your wallet. · Do not take other medications without consulting your doctor.   
 
 
NOTIFY YOUR PHYSICIAN FOR ANY OF THE FOLLOWING:  
 Fever over 101 degrees for 24 hours. Chest pain, shortness of breath, fever, chills, nausea, vomiting, diarrhea, change in mentation, falling, weakness, bleeding. Severe pain or pain not relieved by medications. Or, any other signs or symptoms that you may have questions about. DISPOSITION: 
X  Home With: 
 OT  PT  [facility]  RN  
  
 SNF/Inpatient Rehab/LTAC Independent/assisted living Hospice Other: PROBLEM LIST Updated: Yes X Signed:  
Mir Maldonado NP 
4/17/2018 
8:49 AM 
 
  
  
  
[facility] Announcement We are excited to announce that we are making your provider's discharge notes available to you in [facility]. You will see these notes when they are completed and signed by the physician that discharged you from your recent hospital stay. If you have any questions or concerns about any information you see in [facility], please call the Health Information Department where you were seen or reach out to your Primary Care Provider for more information about your plan of care. Introducing [facility] SERVICES! [facility] introduces [facility] patient portal. Now you can access parts of your medical record, email your doctor's office, and request medication refills online. 1. In your internet browser, go to [email or web address] 2. Click on the First Time User? Click Here link in the Sign In box. You will see the New Member Sign Up page. 3. Enter your [facility] Access Code exactly as it appears below. You will not need to use this code after youve completed the sign-up process. If you do not sign up before the expiration date, you must request a new code. · [facility] Access Code: [email or web address] Expires: 7/14/2018  6:33 AM 
 
4. Enter the last four digits of your Social Security Number (xxxx) and Date of Birth (mm/dd/yyyy) as indicated and click Submit. You will be taken to the next sign-up page. 5. Create a HealthUnlocked ID. This will be your HealthUnlocked login ID and cannot be changed, so think of one that is secure and easy to remember. 6. Create a HealthUnlocked password. You can change your password at any time. 7. Enter your Password Reset Question and Answer. This can be used at a later time if you forget your password. 8. Enter your e-mail address. You will receive e-mail notification when new information is available in [address] 9. Click Sign Up. You can now view and download portions of your medical record. 10. Click the Download Summary menu link to download a portable copy of your medical information. If you have questions, please visit the Frequently Asked Questions section of the HealthUnlocked website. Remember, HealthUnlocked is NOT to be used for urgent needs. For medical emergencies, dial 911. Now available from your iPhone and Android! Introducing Priyank Paula As a Elyssa Bills patient, I wanted to make you aware of our electronic visit tool called Priyank Dreamanuel. [employer] 24/7 allows you to connect within minutes with a medical provider 24 hours a day, seven days a week via a mobile device or tablet or logging into a secure website from your computer. You can access Priyank Hankinsfin from anywhere in the [place]. A virtual visit might be right for you when you have a simple condition and feel like you just dont want to get out of bed, or cant get away from work for an appointment, when your regular [facility] provider is not available (evenings, weekends or holidays), or when youre out of town and need minor care. Electronic visits cost only $49 and if the [facility] 24/7 provider determines a prescription is needed to treat your condition, one can be electronically transmitted to a nearby pharmacy*. Please take a moment to enroll today if you have not already done so. The enrollment process is free and takes just a few minutes.   To enroll, please download the [employer]â€™s 24/7 kyra to your tablet or phone, or visit [email or web address] to enroll on your computer. And, as an [address] [place] [address] patient with a [employer] account, the results of your visits will be scanned into your electronic medical record and your primary care provider will be able to view the scanned results. We urge you to continue to see your regular Porsha Kelly provider for your ongoing medical care. And while your primary care provider may not be the one available when you seek a [employer] virtual visit, the peace of mind you get from getting a real diagnosis real time can be priceless. For more information on [employer], view our Frequently Asked Questions (FAQs) at [email or web address]. Sincerely, 
 
Alexander Georges MD 
Chief Medical Officer [facility] Rosa Isela Celestin *:  certain medications cannot be prescribed via [employer] Providers Seen During Your Hospitalization Provider Specialty Primary office phone Lidia Mcleod MD Emergency Medicine [phone] Ayleen Givens MD Hospitalist [phone] Berry Roberts MD Internal Medicine [phone] Your Primary Care Physician (PCP) Primary Care Physician Office Phone Office Fax NONE ** None ** ** None ** You are allergic to the following No active allergies Recent Documentation Height Weight BMI Smoking Status 1.93 m 95.3 kg 25.56 kg/m2 Current Every Day Smoker Emergency Contacts Name Discharge Info Relation Home Work Mobile 3 C[address] Ximenat Spencer CAREGIVER [3] Father [15] [phone] BenitezClari honeycutt DISCHARGE CAREGIVER [3] Spouse [3]   [phone] Patient Belongings The following personal items are in your possession at time of discharge: 
                             
 
  
  
 Please provide this summary of care documentation to your next provider. Signatures-by signing, you are acknowledging that this After Visit Summary has been reviewed with you and you have received a copy. Patient Signature:  ____________________________________________________________ Date:  ____________________________________________________________  
  
Magali Spare Provider Signature:  ____________________________________________________________ Date:  ____________________________________________________________

## 2018-04-15 NOTE — PROGRESS NOTES
TRANSFER - OUT REPORT:    Verbal report given to [address]  being transferred to [facility] for routine progression of care       Report consisted of patients Situation, Background, Assessment and   Recommendations(SBAR). Information from the following report(s) SBAR, Kardex, ED Summary, Intake/Output, MAR, Recent Results and Cardiac Rhythm nsr was reviewed with the receiving nurse. Lines:   Peripheral IV 04/15/18 Left Antecubital (Active)        Opportunity for questions and clarification was provided.       Patient transported with:   [email or web address]

## 2018-04-15 NOTE — IP AVS SNAPSHOT
27[address] [facility] [address] [place] [address] 
[phone] Patient: Yeimy Yadav MRN: [id number] EK:9/33/9204 A check padmini indicates which time of day the medication should be taken. My Medications START taking these medications Instructions Each Dose to Equal  
 Morning Noon Evening Bedtime  
 carvedilol 25 mg tablet Commonly known as:  Dusty Fonseca Your last dose was: Your next dose is: Take 1 Tab by mouth two (2) times daily (with meals). Indications: chronic heart failure 25 mg  
    
   
   
   
  
 lisinopril 40 mg tablet Commonly known as:  Amador Gracie Your last dose was: Your next dose is: Take 1 Tab by mouth daily. 40 mg CONTINUE taking these medications Instructions Each Dose to Equal  
 Morning Noon Evening Bedtime  
 amLODIPine 10 mg tablet Commonly known as:  Lele Frederick Your last dose was: Your next dose is: Take 1 Tab by mouth daily. Indications: hypertension 10 mg  
    
   
   
   
  
 aspirin delayed-release 81 mg tablet Your last dose was: Your next dose is: Take 1 Tab by mouth daily. 81 mg  
    
   
   
   
  
 atorvastatin 10 mg tablet Commonly known as:  LIPITOR Your last dose was: Your next dose is: Take 0.5 Tabs by mouth nightly. 5 mg Where to Get Your Medications Information on where to get these meds will be given to you by the nurse or doctor. ! Ask your nurse or doctor about these medications  
  amLODIPine 10 mg tablet  
 aspirin delayed-release 81 mg tablet  
 atorvastatin 10 mg tablet  
 carvedilol 25 mg tablet  
 lisinopril 40 mg tablet

## 2018-04-15 NOTE — ED NOTES
Bedside and Verbal shift change report given to [address] [place] [address] [address] and Marisela RN (oncoming nurse) by Annabel Bales (offgoing nurse). Report included the following information SBAR, Kardex, ED Summary, Intake/Output and MAR.

## 2018-04-15 NOTE — ED NOTES
Pt resting comfortably on stretcher with lights dimmed. Appears more calm and relaxed.   Call bell within reach; will continue to monitor

## 2018-04-15 NOTE — ED NOTES
Bedside and Verbal shift change report given to [address] [place] [address] [address] and Marisela RN (oncoming nurse) by Annabel Bales (offgoing nurse). Report included the following information SBAR, Kardex, ED Summary, [facility] and Recent Results.

## 2018-04-15 NOTE — ED PROVIDER NOTES
HPI Comments: This is a 56-year-old male with a history of coronary disease manifested by elevated troponins approximately one year ago with a negative catheterization. He had an EF at the time of 20%. He was admitted with shortness of breath and some chest pain. He was never followed up by cardiology subsequent to his discharge. He has history of hypertension, he smokes and a positive family history of coronary disease. He has not been taking any of his medications for the last 2 months. He presents today with 2 days of difficulty sleeping, shortness of breath, and some intermittent discomfort in his chest. He has had no fever or chills no cough or congestion. He denies any abdominal pain or other GI/ symptoms. There's been no leg pain or other acute finding. He states that he feels predominantly anxious and thinks that his problem. Patient is a 56 y.o. male presenting with shortness of breath. Shortness of Breath   Associated symptoms include chest pain. Pertinent negatives include no headaches, no sore throat, no ear pain, no neck pain, no wheezing, no vomiting, no abdominal pain and no rash. Past Medical History:   Diagnosis Date    Diabetes ([address]     Heart failure ([facility] [address] [facility]     Hypertension        Past Surgical History:   Procedure Laterality Date    HX KNEE REPLACEMENT      left         History reviewed. No pertinent family history. Social History     Social History    Marital status: SINGLE     Spouse name: N/A    Number of children: N/A    Years of education: N/A     Occupational History    Not on file. Social History Main Topics    Smoking status: Current Every Day Smoker     Packs/day: 1.00    Smokeless tobacco: Never Used    Alcohol use Yes      Comment: 2 beers     Drug use: No    Sexual activity: Not on file     Other Topics Concern    Not on file     Social History Narrative         ALLERGIES: Review of patient's allergies indicates no known allergies.     Review of Systems   Constitutional: Negative for activity change, appetite change and fatigue. HENT: Negative for ear pain, facial swelling, sore throat and trouble swallowing. Eyes: Negative for pain, discharge and visual disturbance. Respiratory: Positive for shortness of breath. Negative for chest tightness and wheezing. Cardiovascular: Positive for chest pain. Negative for palpitations. Gastrointestinal: Negative for abdominal pain, blood in stool, nausea and vomiting. Genitourinary: Negative for difficulty urinating, flank pain and hematuria. Musculoskeletal: Negative for arthralgias, joint swelling, myalgias and neck pain. Skin: Negative for color change and rash. Neurological: Negative for dizziness, weakness, numbness and headaches. Hematological: Negative for adenopathy. Does not bruise/bleed easily. Psychiatric/Behavioral: Positive for agitation. Negative for behavioral problems, confusion and sleep disturbance. All other systems reviewed and are negative. Vitals:    04/15/18 0633 04/15/18 0700   BP: (!) 206/138 (!) 204/137   Pulse: 95 (!) 101   Resp: 22 24   Temp: 99 °F (37.2 °C)    SpO2: 97% 96%   Weight: 95.3 kg (210 lb)    Height: 6' 4\" (1.93 m)             Physical Exam   Constitutional: He is oriented to person, place, and time. He appears well-developed and well-nourished. No distress. Patient is extremely anxious. VS as noted. Hypertensive. HENT:   Head: Normocephalic and atraumatic. Nose: Nose normal.   Mouth/Throat: Oropharynx is clear and moist.   Eyes: Conjunctivae and EOM are normal. Pupils are equal, round, and reactive to light. No scleral icterus. Neck: Normal range of motion. Neck supple. No JVD present. No tracheal deviation present. No thyromegaly present. No carotid bruits noted. Cardiovascular: Normal rate, regular rhythm, normal heart sounds and intact distal pulses. Exam reveals no gallop and no friction rub. No murmur heard.   Pulmonary/Chest: Effort normal and breath sounds normal. No respiratory distress. He has no wheezes. He has no rales. He exhibits no tenderness. Abdominal: Soft. Bowel sounds are normal. He exhibits no distension and no mass. There is no tenderness. There is no rebound and no guarding. Musculoskeletal: Normal range of motion. He exhibits no edema or tenderness. Lymphadenopathy:     He has no cervical adenopathy. Neurological: He is alert and oriented to person, place, and time. He has normal reflexes. No cranial nerve deficit. Coordination normal.   Skin: Skin is warm and dry. No rash noted. No erythema. Psychiatric: He has a normal mood and affect. His behavior is normal. Judgment and thought content normal.   Nursing note and vitals reviewed. MDM  Number of Diagnoses or Management Options  Chest pain, unspecified type: new and requires workup  Elevated troponin: new and requires workup     Amount and/or Complexity of Data Reviewed  Clinical lab tests: ordered and reviewed  Tests in the radiology section of CPT®: ordered and reviewed  Decide to obtain previous medical records or to obtain history from someone other than the patient: yes  Review and summarize past medical records: yes  Independent visualization of images, tracings, or specimens: yes    Risk of Complications, Morbidity, and/or Mortality  Presenting problems: moderate  Diagnostic procedures: moderate  Management options: moderate    Patient Progress  Patient progress: stable        ED Course       Procedures  ED MD EKG interpretation: NSR with rate 99 BPM. No ectopy noted. LAD noted. Normal axis. T wave inversion V5-6. Compared to tracing 14 July 2017 There is marked improvement in T wave changes Inf and lat. Syeda Kellogg MD    As noted above, the patient's EKG today appears improved from the tracing in July of last year. His troponin is noted to be 0.69 today. He is hypertensive and quite anxious.  He has been given baby aspirin, medications for his pressure, and he is not having any active chest discomfort at the moment. He is quite anxious and is being treated for the same. Cardiology has been called to see the patient and he will require admission for further evaluation and treatment. Pressure is also being treated presently. The patient remained stable in the ED. Troponin came back at 0.69. It is noted that he has an EF of 20%. This was last year.  Given his noncompliance, cardiology little like the patient to be admitted to the hospitalist service and may need evaluation by case management to be sure he gets the medications that he needs to control his blood pressure

## 2018-04-15 NOTE — PROGRESS NOTES
1615: TRANSFER - IN REPORT:    Verbal report received from [place], RN(name) on Stephanie Diez  being received from ED(unit) for routine progression of care      Report consisted of patients Situation, Background, Assessment and   Recommendations(SBAR). Information from the following report(s) SBAR, Kardex, Intake/Output, MAR, Recent Results and Cardiac Rhythm NSR was reviewed with the receiving nurse. Opportunity for questions and clarification was provided. Assessment completed upon patients arrival to unit and care assumed. Skin Assessment:   Primary Nurse Syd Winston and Albert Pereira RN performed a dual skin assessment on this patient Impairment noted- skin tears noted on BUE--wound care consulted  Colt score is 22    Bedside shift change report given to Courtenay Gowers, RN (oncoming nurse) by Luis Haddad RN (offgoing nurse). Report included the following information SBAR, Kardex, Intake/Output, MAR, Recent Results and Cardiac Rhythm NSR.

## 2018-04-15 NOTE — ED TRIAGE NOTES
Patient arrived via EMS from home reporting shortness of breath. Patient reports having episodes of waking feeling anxious and having shortness of breath for the past few days. Patient reports it was worse tonight and he felt like he couldn't take a deep breath. Patient reports a history of Legionaires. Patient reports some anxiety due to life events. Patient reports intermittent chest pain. Patient was administered 1\" of nitro paste and 40 mg of Lasix en route. Patient reports not having taken his medications recently.

## 2018-04-15 NOTE — H&P
HISTORY AND PHYSICAL      PCP: None  History source: the patient      CC: shortness of breath      HPI: 56 y.o man with a hx of non-ischemic cardiomyopathy and HTN who presents with dyspnea. Symptoms began about 2 days ago when he noted progressively worsening dyspnea at rest and especially with exertion. Symptoms are worsened with activity and rest provides some relief. He denies associated chest pain, PND, orthopnea, or leg swelling, but states he's had concomitant anxiety/panic attacks that sometimes awaken him from sleep. He stopped taking his medications for the past 2 months because they \"they make me feel bad and I can't work with them,\" though he doesn't elaborate any more. He had a cardiac catheterization about a year ago which showed normal coronaries but an LVEF of 20%. PMH/PSH:  Past Medical History:   Diagnosis Date    [facility] [address]     Heart failure ([facility] [address]     Hypertension    Non-ischemic cardiomyopathy    Past Surgical History:   Procedure Laterality Date    HX KNEE REPLACEMENT      left       Home meds: stopped taking meds for past 2 months  Prior to Admission medications    Medication Sig Start Date End Date Taking? Authorizing Provider   HYDROcodone-acetaminophen (NORCO) 5-325 mg per tablet Take 1-2 Tabs by mouth every eight (8) hours as needed for Pain. Max Daily Amount: 6 Tabs. 12/8/17   Leydi Medina MD   naproxen (NAPROSYN) 500 mg tablet Take 1 Tab by mouth two (2) times daily (with meals). 12/8/17   Leydi Medina MD   amLODIPine (NORVASC) 10 mg tablet Take 1 Tab by mouth daily. Indications: hypertension 7/14/17   Keny Snider NP   aspirin delayed-release 81 mg tablet Take 1 Tab by mouth daily. 7/14/17   Keny Snider NP   atorvastatin (LIPITOR) 10 mg tablet Take 0.5 Tabs by mouth nightly. 7/14/17   Keny Snider NP   carvedilol (COREG) 12.5 mg tablet Take 1 Tab by mouth two (2) times daily (with meals).  Indications: Chronic Heart Failure 7/14/17   Brenda FERGUSON Nina Graham NP   lisinopril (PRINIVIL, ZESTRIL) 10 mg tablet Take 1 Tab by mouth daily. Indications: Chronic Heart Failure 7/14/17   Nikhil Hernandez NP       Allergies:  No Known Allergies    FH:  History reviewed. No pertinent family history. SH:  Social History   Substance Use Topics    Smoking status: Current Every Day Smoker     Packs/day: 1.00    Smokeless tobacco: Never Used    Alcohol use Yes      Comment: 2 beers        ROS: A comprehensive review of systems was negative except for that written in the HPI. in addition to: he reports bilateral leg pain that is chronic over the thighs but has worsened lately. PHYSICAL EXAM:  Visit Vitals    BP (!) 186/136    Pulse 94    Temp 99 °F (37.2 °C)    Resp 20    Ht 6' 4\" (1.93 m)    Wt 95.3 kg (210 lb)    SpO2 97%    BMI 25.56 kg/m2       Gen: NAD, non-toxic  HEENT: anicteric sclerae, normal conjunctiva, oropharynx clear, MM moist  Neck: supple, trachea midline, no adenopathy  Heart: RRR, no MRG, no JVD, no peripheral edema  Lungs: CTA b/l, non-labored respirations  Abd: soft, NT, ND, BS+, no organomegaly  Extr: warm  Skin: dry, no rash  Neuro: CN II-XII grossly intact, normal speech, moves all extremities  Psych: irritable      Labs/Imaging:  Recent Results (from the past 24 hour(s))   CBC WITH AUTOMATED DIFF    Collection Time: 04/15/18  6:52 AM   Result Value Ref Range    WBC 9.9 4.1 - 11.1 K/uL    RBC 4.94 4.10 - 5.70 M/uL    HGB 16.7 12.1 - 17.0 g/dL    HCT 46.9 36.6 - 50.3 %    MCV 94.9 80.0 - 99.0 FL    MCH 33.8 26.0 - 34.0 PG    MCHC 35.6 30.0 - 36.5 g/dL    RDW 11.7 11.5 - 14.5 %    PLATELET 281 269 - 362 K/uL    MPV 10.2 8.9 - 12.9 FL    NRBC 0.0 0  WBC    ABSOLUTE NRBC 0.00 0.00 - 0.01 K/uL    NEUTROPHILS 69 32 - 75 %    LYMPHOCYTES 22 12 - 49 %    MONOCYTES 5 5 - 13 %    EOSINOPHILS 2 0 - 7 %    BASOPHILS 1 0 - 1 %    IMMATURE GRANULOCYTES 0 0.0 - 0.5 %    ABS. NEUTROPHILS 6.8 1.8 - 8.0 K/UL    ABS.  LYMPHOCYTES 2.2 0.8 - 3.5 K/UL    ABS. MONOCYTES 0.5 0.0 - 1.0 K/UL    ABS. EOSINOPHILS 0.2 0.0 - 0.4 K/UL    ABS. BASOPHILS 0.1 0.0 - 0.1 K/UL    ABS. IMM. GRANS. 0.0 0.00 - 0.04 K/UL    DF AUTOMATED     METABOLIC PANEL, COMPREHENSIVE    Collection Time: 04/15/18  6:52 AM   Result Value Ref Range    Sodium 137 136 - 145 mmol/L    Potassium 3.8 3.5 - 5.1 mmol/L    Chloride 103 97 - 108 mmol/L    CO2 24 21 - 32 mmol/L    Anion gap 10 5 - 15 mmol/L    Glucose 93 65 - 100 mg/dL    BUN 13 6 - 20 MG/DL    Creatinine 1.28 0.70 - 1.30 MG/DL    BUN/Creatinine ratio 10 (L) 12 - 20      GFR est AA >60 >60 ml/min/1.73m2    GFR est non-AA 58 (L) >60 ml/min/1.73m2    Calcium 8.7 8.5 - 10.1 MG/DL    Bilirubin, total 0.9 0.2 - 1.0 MG/DL    ALT (SGPT) 101 (H) 12 - 78 U/L    AST (SGOT) 76 (H) 15 - 37 U/L    Alk.  phosphatase 121 (H) 45 - 117 U/L    Protein, total 8.7 (H) 6.4 - 8.2 g/dL    Albumin 3.7 3.5 - 5.0 g/dL    Globulin 5.0 (H) 2.0 - 4.0 g/dL    A-G Ratio 0.7 (L) 1.1 - 2.2     TROPONIN I    Collection Time: 04/15/18  6:52 AM   Result Value Ref Range    Troponin-I, Qt. 0.69 (H) <0.05 ng/mL   CK W/ REFLX CKMB    Collection Time: 04/15/18  6:52 AM   Result Value Ref Range     39 - 308 U/L   D DIMER    Collection Time: 04/15/18  6:52 AM   Result Value Ref Range    D-dimer 2.96 (H) 0.00 - 0.65 mg/L FEU   DRUG SCREEN, URINE    Collection Time: 04/15/18  8:31 AM   Result Value Ref Range    AMPHETAMINES NEGATIVE  NEG      BARBITURATES NEGATIVE  NEG      BENZODIAZEPINES NEGATIVE  NEG      COCAINE NEGATIVE  NEG      METHADONE NEGATIVE  NEG      OPIATES NEGATIVE  NEG      PCP(PHENCYCLIDINE) NEGATIVE  NEG      THC (TH-CANNABINOL) NEGATIVE  NEG      Drug screen comment (NOTE)        Recent Labs      04/15/18   0652   WBC  9.9   HGB  16.7   HCT  46.9   PLT  232     Recent Labs      04/15/18   0652   NA  137   K  3.8   CL  103   CO2  24   BUN  13   CREA  1.28   GLU  93   CA  8.7     Recent Labs      04/15/18   0652   SGOT  76*   ALT  101*   AP  121*   TBILI 0.9   TP  8.7*   ALB  3.7   GLOB  5.0*       Recent Labs      04/15/18   0652   TROIQ  0.69*     Urine drug screen negative    CXR: pending        Assessment & Plan: 56 y.o man with a hx of non-ischemic cardiomyopathy and HTN who presents with dyspnea and is found to be severely hypertensive. Hypertensive urgency: (POA) likely due to med non-compliance; he stopped taking his meds 2 months ago  -resume home meds including amlodipine, lisinopril, and carvedilol  -consult case management    Non-ischemic cardiomyopathy: (POA) doesn't appear to be in CHF  -resume lisinopril and Coreg as above  -echocardiogram  -troponin 0.69 but he denies chest pain and in the setting of uncontrolled HTN this is not surprising. Cardiology consulted by the ED, will f/u recs    Bilateral leg pain: (POA) this is chronic but worsened recently  -check LE dopplers to r/o DVT  -PVD/claudications?  Check SILVERIO's    Type 2 DM:   -POC checks and SSI  -check hgb a1c  -check lipid panel    Panic attacks, irritability: (POA) UDS negative  -consult psychiatry    DVT ppx: sq Lovenox  Code status: full  Disposition: home when medically appropriate    Signed By: Baldomero Steve MD     April 15, 2018

## 2018-04-15 NOTE — CONSULTS
Cardiac Electrophysiology Consultation Note     Subjective:      Barbara Reddy is a 56 y.o. patient who is seen for evaluation of cardiomyopathy with LVEF 20%  He had this on echo and cardiac cath in 7/2017 when he was seen by Dr Jammie Franks at [facility] with troponin 0.99  He had normal coronary arteries on cardiac cath and LVEF 20%  Echo with LVEF 15-20% and mild to moderate MR  ECG with LVH and repolarization abnormality   He took medications for 2 months then ran out and lost job so he did not take any more  He does not want to come back to [facility] or see Dr Jammie Franks again so he has gotten refill by any one  No syncope  He said he came in because he kept waking up with shortness of breath/tightness and startled    Patient Active Problem List   Diagnosis Code    ACS (acute coronary syndrome) ([facility] [address] I24.9    Essential hypertension I10    Tobacco abuse Z72.0    NICM (nonischemic cardiomyopathy) ([facility] [address] I42.8    S/P cardiac cath Z98.890    [facility] I[facility]    Shortness of breath R06.02    Hypertensive urgency I16.0     Current Facility-Administered Medications   Medication Dose Route Frequency Provider Last Rate Last Dose    LORazepam (ATIVAN) 2 mg/mL injection             nitroglycerin (NITROBID) 2 % ointment 1 Inch  1 Inch Topical NOW Mojgan Aguillon MD        carvedilol (COREG) tablet 25 mg  25 mg Oral BID WITH MEALS Mojgan Aguillon MD        lisinopril (PRINIVIL, ZESTRIL) tablet 40 mg  40 mg Oral DAILY Mojgan Aguillon MD         Current Outpatient Prescriptions   Medication Sig Dispense Refill    HYDROcodone-acetaminophen (NORCO) 5-325 mg per tablet Take 1-2 Tabs by mouth every eight (8) hours as needed for Pain. Max Daily Amount: 6 Tabs. 20 Tab 0    naproxen (NAPROSYN) 500 mg tablet Take 1 Tab by mouth two (2) times daily (with meals). 30 Tab 0    amLODIPine (NORVASC) 10 mg tablet Take 1 Tab by mouth daily.  Indications: hypertension 30 Tab 11    aspirin delayed-release 81 mg tablet Take 1 Tab by mouth daily. 30 Tab 11    atorvastatin (LIPITOR) 10 mg tablet Take 0.5 Tabs by mouth nightly. 30 Tab 6    carvedilol (COREG) 12.5 mg tablet Take 1 Tab by mouth two (2) times daily (with meals). Indications: Chronic Heart Failure 60 Tab 11    lisinopril (PRINIVIL, ZESTRIL) 10 mg tablet Take 1 Tab by mouth daily. Indications: Chronic Heart Failure 30 Tab 11     No Known Allergies  Past Medical History:   Diagnosis Date    Diabetes ([facility] [address]     Heart failure ([facility] [address]     Hypertension      Past Surgical History:   Procedure Laterality Date    HX KNEE REPLACEMENT      left     family history no ICD or sudden death and CHF  Social History   Substance Use Topics    Smoking status: Current Every Day Smoker     Packs/day: 1.00    Smokeless tobacco: Never Used    Alcohol use Yes      Comment: 2 beers         Review of Systems:   Constitutional: Negative for fever, chills, weight loss, + malaise/fatigue. HEENT: Negative for nosebleeds, vision changes. Respiratory: Negative for cough, hemoptysis  Cardiovascular: Negative for chest pain, palpitations, orthopnea, claudication, leg swelling, syncope, and PND. Gastrointestinal: Negative for nausea, vomiting, diarrhea, blood in stool and melena. Genitourinary: Negative for dysuria, and hematuria. Musculoskeletal: Negative for myalgias, arthralgia. Skin: Negative for rash. Heme: Does not bleed or bruise easily. Neurological: Negative for speech change and focal weakness     Objective:     Visit Vitals    BP (!) 181/120    Pulse 91    Temp 99 °F (37.2 °C)    Resp 25    Ht 6' 4\" (1.93 m)    Wt 210 lb (95.3 kg)    SpO2 99%    BMI 25.56 kg/m2      Physical Exam:   Constitutional: well-developed and well-nourished. No respiratory distress. Head: Normocephalic and atraumatic. Eyes: Pupils are equal, round  ENT: hearing normal  Neck: supple. No JVD present. Cardiovascular: Normal rate, regular rhythm.  Exam reveals no gallop and no friction rub. No murmur heard. Pulmonary/Chest: Effort normal and breath sounds normal. No wheezes. Abdominal: Soft, no tenderness. Musculoskeletal: no edema. Neurological: alert,oriented. Skin: Skin is warm and dry  Psychiatric: normal mood and affect. Behavior is normal. Judgment and thought content normal.           Assessment/Plan:   Hypertension urgency  Chronic systolic CHF NYHA class 1-2  Non ischemic cardiomyopathy  Known troponin elevation in the past and now-not NSTEMI  Smoking  Loss of job and insurance so ran out of meds    He said he can afford drugs as he stops smoking now  Will start coreg and lisinopril again and nitro paste for BP today  Echo repeat  Not yet candidate for ICD  Will follow up here and in office and if compliant with medications in 3 months he may be candidate for ICD implant   I will get him help with care card when he comes to office for follow up later  Will add aldactone later    Thank you for involving me in this patient's care and please call with further concerns or questions. Melvin Khoury M.D.   Electrophysiology/Cardiology  [facility]  [address], [facility] [address] [place] [address]  [phone]

## 2018-04-15 NOTE — PROGRESS NOTES
Admission Medication Reconciliation:    Information obtained from: Patient    Significant PMH/Disease States:   Past Medical History:   Diagnosis Date    Diabetes ([facility] [address]     Heart failure ([facility] [address]     Hypertension        Chief Complaint for this Admission:  SOB    Allergies:  Review of patient's allergies indicates no known allergies. Prior to Admission Medications:   None         Comments/Recommendations: This very pleasant gentleman disclosed that he does not take any medications at all (\"they all make me feel funny\"). Note that his SBP ~ 195 while In interviewed him, discussed this finding with him (as trended values were all high on monitor). Allergies were confirmed as NKDA. Please note:  1. ETOH: drinks 2 \"large\" bottles of beer every day. No s/s withdrawal, however, patient received Lorazepam since he has been here today. Spoke with bedside RN, asked her to pass along in report that patient should be monitored closely for withdrawal. Discussed with patient as well (s/s to report)  2. LISINOPRIL: does not want to take (\"I've heard bad stuff about it\")--is open to trying alternative medication, TT to hospitalist.     Deleted all meds:  Amlodipine, Carvedilol, ASA, Atorvastatin, Vicodin, Lisinopril, Naproxyn    Thank you for allowing me to participate in the care of your patient.     Stacey Mojica PharmD, RN #[id number]

## 2018-04-15 NOTE — ED NOTES
PT resting comfortably on stretcher with lights out trying to relax.  States he has had lots of anxiety over the last few weeks

## 2018-04-16 ENCOUNTER — APPOINTMENT (OUTPATIENT)
Dept: NUCLEAR MEDICINE | Age: 58
DRG: 305 | End: 2018-04-16
Attending: NURSE PRACTITIONER
Payer: SELF-PAY

## 2018-04-16 ENCOUNTER — TELEPHONE (OUTPATIENT)
Dept: CARDIOLOGY CLINIC | Age: 58
End: 2018-04-16

## 2018-04-16 DIAGNOSIS — I42.8 NICM (NONISCHEMIC CARDIOMYOPATHY) (HCC): Primary | ICD-10-CM

## 2018-04-16 LAB
ANION GAP SERPL CALC-SCNC: 12 MMOL/L (ref 5–15)
ATRIAL RATE: 99 BPM
BUN SERPL-MCNC: 24 MG/DL (ref 6–20)
BUN/CREAT SERPL: 15 (ref 12–20)
CALCIUM SERPL-MCNC: 8.7 MG/DL (ref 8.5–10.1)
CALCULATED P AXIS, ECG09: 22 DEGREES
CALCULATED R AXIS, ECG10: -26 DEGREES
CALCULATED T AXIS, ECG11: 114 DEGREES
CHLORIDE SERPL-SCNC: 103 MMOL/L (ref 97–108)
CHOLEST SERPL-MCNC: 154 MG/DL
CO2 SERPL-SCNC: 22 MMOL/L (ref 21–32)
CREAT SERPL-MCNC: 1.55 MG/DL (ref 0.7–1.3)
DIAGNOSIS, 93000: NORMAL
EST. AVERAGE GLUCOSE BLD GHB EST-MCNC: 105 MG/DL
GLUCOSE BLD STRIP.AUTO-MCNC: 100 MG/DL (ref 65–100)
GLUCOSE BLD STRIP.AUTO-MCNC: 102 MG/DL (ref 65–100)
GLUCOSE BLD STRIP.AUTO-MCNC: 104 MG/DL (ref 65–100)
GLUCOSE BLD STRIP.AUTO-MCNC: 95 MG/DL (ref 65–100)
GLUCOSE SERPL-MCNC: 94 MG/DL (ref 65–100)
HBA1C MFR BLD: 5.3 % (ref 4.2–6.3)
HDLC SERPL-MCNC: 80 MG/DL
HDLC SERPL: 1.9 {RATIO} (ref 0–5)
LDLC SERPL CALC-MCNC: 63.4 MG/DL (ref 0–100)
LIPID PROFILE,FLP: NORMAL
MAGNESIUM SERPL-MCNC: 2.2 MG/DL (ref 1.6–2.4)
P-R INTERVAL, ECG05: 166 MS
PHOSPHATE SERPL-MCNC: 4.4 MG/DL (ref 2.6–4.7)
POTASSIUM SERPL-SCNC: 3.4 MMOL/L (ref 3.5–5.1)
Q-T INTERVAL, ECG07: 374 MS
QRS DURATION, ECG06: 104 MS
QTC CALCULATION (BEZET), ECG08: 479 MS
SERVICE CMNT-IMP: ABNORMAL
SERVICE CMNT-IMP: ABNORMAL
SERVICE CMNT-IMP: NORMAL
SERVICE CMNT-IMP: NORMAL
SODIUM SERPL-SCNC: 137 MMOL/L (ref 136–145)
TRIGL SERPL-MCNC: 53 MG/DL (ref ?–150)
VENTRICULAR RATE, ECG03: 99 BPM
VLDLC SERPL CALC-MCNC: 10.6 MG/DL

## 2018-04-16 PROCEDURE — 93922 UPR/L XTREMITY ART 2 LEVELS: CPT

## 2018-04-16 PROCEDURE — 74011250637 HC RX REV CODE- 250/637: Performed by: HOSPITALIST

## 2018-04-16 PROCEDURE — C8923 2D TTE W OR W/O FOL W/CON,CO: HCPCS

## 2018-04-16 PROCEDURE — 36415 COLL VENOUS BLD VENIPUNCTURE: CPT | Performed by: HOSPITALIST

## 2018-04-16 PROCEDURE — 83036 HEMOGLOBIN GLYCOSYLATED A1C: CPT | Performed by: HOSPITALIST

## 2018-04-16 PROCEDURE — 74011250637 HC RX REV CODE- 250/637: Performed by: INTERNAL MEDICINE

## 2018-04-16 PROCEDURE — 74011250637 HC RX REV CODE- 250/637: Performed by: NURSE PRACTITIONER

## 2018-04-16 PROCEDURE — A9558 XE133 XENON 10MCI: HCPCS

## 2018-04-16 PROCEDURE — 84100 ASSAY OF PHOSPHORUS: CPT | Performed by: HOSPITALIST

## 2018-04-16 PROCEDURE — 82962 GLUCOSE BLOOD TEST: CPT

## 2018-04-16 PROCEDURE — 83735 ASSAY OF MAGNESIUM: CPT | Performed by: HOSPITALIST

## 2018-04-16 PROCEDURE — 80061 LIPID PANEL: CPT | Performed by: HOSPITALIST

## 2018-04-16 PROCEDURE — 80048 BASIC METABOLIC PNL TOTAL CA: CPT | Performed by: HOSPITALIST

## 2018-04-16 PROCEDURE — 65660000000 HC RM CCU STEPDOWN

## 2018-04-16 PROCEDURE — 74011250636 HC RX REV CODE- 250/636: Performed by: HOSPITALIST

## 2018-04-16 RX ORDER — POTASSIUM CHLORIDE 750 MG/1
40 TABLET, FILM COATED, EXTENDED RELEASE ORAL
Status: COMPLETED | OUTPATIENT
Start: 2018-04-16 | End: 2018-04-16

## 2018-04-16 RX ADMIN — LISINOPRIL 40 MG: 20 TABLET ORAL at 09:13

## 2018-04-16 RX ADMIN — POTASSIUM CHLORIDE 40 MEQ: 750 TABLET, EXTENDED RELEASE ORAL at 09:12

## 2018-04-16 RX ADMIN — ASPIRIN 81 MG: 81 TABLET, COATED ORAL at 09:13

## 2018-04-16 RX ADMIN — AMLODIPINE BESYLATE 10 MG: 5 TABLET ORAL at 09:13

## 2018-04-16 RX ADMIN — Medication 10 ML: at 22:00

## 2018-04-16 RX ADMIN — CARVEDILOL 25 MG: 12.5 TABLET, FILM COATED ORAL at 16:51

## 2018-04-16 RX ADMIN — ENOXAPARIN SODIUM 40 MG: 40 INJECTION SUBCUTANEOUS at 12:37

## 2018-04-16 RX ADMIN — CARVEDILOL 25 MG: 12.5 TABLET, FILM COATED ORAL at 09:12

## 2018-04-16 RX ADMIN — Medication 10 ML: at 16:53

## 2018-04-16 RX ADMIN — Medication 10 ML: at 06:00

## 2018-04-16 RX ADMIN — ATORVASTATIN CALCIUM 5 MG: 10 TABLET, FILM COATED ORAL at 21:53

## 2018-04-16 NOTE — TELEPHONE ENCOUNTER
Attempted to reach patient's emergency contact, Zuhair Flores, for updated phone number for patient. A message was left for return call.

## 2018-04-16 NOTE — WOUND CARE
WOCN Note:   New consult placed by RN for skin assessment; Chart review revealed:   Admitted for SOB, HTN; history of smoker, cardiac cath, EF 20%, legionaires; Admitted from home    Assessment:   Patient is alert, verbal denied pain;   Bed: [facility]acare  Patient continent  Diet: cardiac  Bilateral heels and sacral areas skin intact and without erythema. Bilateral lower extremities with scattered darker pigment areas; Chest and back with pigment color changes; Bilateral upper extremities with scattered wounds in various stages of healing; all dry, stable with scabs; Patient ambulated in room without assist     Skin/wound treatment Recommendations:    See a dermatologist for follow up after discharge;     Skin Care & Pressure Injury Prevention Recommendations:  1. Minimize layers of linen/pads under patient to optimize support surface. 2.  Encourage patient to reposition approximately every 2 hours;  3. Float heels with pillow under calves. 4.  promote continence;  5. Continue on [facility]acare for pressure redistribution. 6.  Assess/protect skin in contact with medical devices. Keep skin moisturized;   7. Ensure that patient is repositioning in chair shifting weight approximately every 15 minutes and standing up every hour.      Discussed above assessment and recommendations with Shari Tate (AYDE)    Transition of Care: Plan to follow weekly and as needed while admitted to hospital.    Chacho Doyle RN  Certified Wound, Ostomy, Continence Nurse  office [phone]  pager [phone]

## 2018-04-16 NOTE — PROGRESS NOTES
Reason for Admission:         Cardiomyopathy, non-compliance with medications. RRAT Score:         8 (RRAT SCORE)     Plan for utilizing home health:       Patient uninsured, so [facility] for any needs. Likelihood of Readmission:       Low. Transition of Care Plan:        CM Specialist will set patient up with one of the community clinics so he can see a PCP and get his prescriptions filled. Care Manager will help patient with medications at discharge one time. Patient at [facility] in July 2017, but they did not assist with DC meds. Patient is single. He smokes. Has not taken his mediations for the last 2 months.

## 2018-04-16 NOTE — CONSULTS
PSYCHIATRIC CONSULTATION                         IDENTIFICATION:    Patient Name  Paco Hinton   Date of Birth 1960   [facility] [id number]   Medical Record Number  [id number]      Age  56 y.o. PCP None   Admit date:  4/15/2018    Room Number  636/65  @ [facility]   Date of Service  4/16/2018            HISTORY         REASON FOR HOSPITALIZATION/CONSULTATION:  Psychiatry asked to see re anxiety  CC: \"Feeling tight chest and short of breath and so came to hospital.  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 He was no able to keep up with medication, especially cardiac being a problem. He has stress related to watching his father with who,m he is living as his father needs supervisory help. Pt is assisted by his female  and she provides support. States he is worried and anxious about $/work/health. States no history of psychiatric treatment or substance abuse. Feeling some better since coming to ED and getting started with treatment. ALLERGIES:  No Known Allergies   MEDICATIONS PRIOR TO ADMISSION:  No prescriptions prior to admission.       PAST MEDICAL HISTORY:  Active Ambulatory Problems     Diagnosis Date Noted    ACS (acute coronary syndrome) ([address] 07/13/2017    Essential hypertension 07/13/2017    Tobacco abuse 07/13/2017    NICM (nonischemic cardiomyopathy) ([address] 07/14/2017    S/P cardiac cath 07/14/2017    Nonischemic dilated cardiomyopathy ([address] 07/15/2017     Resolved Ambulatory Problems     Diagnosis Date Noted    No Resolved Ambulatory Problems     Past Medical History:   Diagnosis Date    Diabetes ([address]     Heart failure ([address]     Hypertension       Past Medical History:   Diagnosis Date    Diabetes ([address]     Heart failure ([address]     Hypertension      Past Surgical History: Procedure Laterality Date    HX KNEE REPLACEMENT      left      SOCIAL HISTORY: lives with father, caretaker for assisted living level of needs for father. Finances difficult at this time  Social History     Social History Narrative     Social History   Substance Use Topics    Smoking status: Current Every Day Smoker     Packs/day: 1.00    Smokeless tobacco: Never Used    Alcohol use Yes      Comment: 2 beers       FAMILY HISTORY: History reviewed. No pertinent family history. History reviewed. No pertinent family history.     REVIEW of SYSTEMS:   Dyspnea and chest discomfort iproving             MENTAL STATUS EXAM & VITALS       MENTAL STATUS EXAM:    FINDINGS WITHIN NORMAL LIMITS (WNL) UNLESS OTHERWISE STATED BELOW:    Orientation oriented to time, place and person   Vital Signs (BP,Pulse, Temp) See below (reviewed 4/16/2018)   Gait and Station Within normal limits   Abnormal Muscular Movements/Tone/Behavior No EPS, no Tardive Dyskinesia, no abnormal muscular movements; wnl tone   Relations cooperative   General Appearance:  age appropriate and within normal Limits   Language No aphasia or dysarthria   Speech:  normal pitch, normal volume and non-pressured   Thought Processes logical, wnl rate of thoughts, good abstract reasoning and computation   Thought Associations goal directed   Thought Content free of delusions, free of hallucinations and not internally preoccupied    Suicidal Ideations none   Homicidal Ideations none   Mood:  within normal limits   Affect:  anxious   Memory recent  adequate   Memory remote:  adequate   Concentration/Attention:  adequate   Fund of Knowledge Fair/average   Insight:  good   Reliability good   Judgment:  good          VITALS:     Patient Vitals for the past 24 hrs:   Temp Pulse Resp BP SpO2   04/16/18 0742 98.1 °F (36.7 °C) 79 20 135/77 97 %   04/16/18 0345 98.3 °F (36.8 °C) 83 20 141/87 95 %   04/15/18 2331 98.1 °F (36.7 °C) 85 20 154/76 94 %   04/15/18 1900 98.1 °F (36.7 °C) 88 20 107/68 94 %   04/15/18 1834 - - - 119/76 -   04/15/18 1612 98.5 °F (36.9 °C) 85 20 (!) 153/107 96 %   04/15/18 1540 - 85 21 154/84 97 %   04/15/18 1300 - 93 22 (!) 173/97 98 %   04/15/18 1239 - 93 - (!) 193/127 -   04/15/18 1200 - 88 22 (!) 170/121 96 %   04/15/18 1025 - 91 25 (!) 181/120 99 %   04/15/18 1003 - 94 20 (!) 186/136 97 %   04/15/18 0923 - 90 16 (!) 184/106 95 %   04/15/18 0908 - 97 25 (!) 175/121 96 %              DATA     LABORATORY DATA:  Labs Reviewed   METABOLIC PANEL, COMPREHENSIVE - Abnormal; Notable for the following:        Result Value    BUN/Creatinine ratio 10 (*)     GFR est non-AA 58 (*)     ALT (SGPT) 101 (*)     AST (SGOT) 76 (*)     Alk.  phosphatase 121 (*)     Protein, total 8.7 (*)     Globulin 5.0 (*)     A-G Ratio 0.7 (*)     All other components within normal limits   TROPONIN I - Abnormal; Notable for the following:     Troponin-I, Qt. 0.69 (*)     All other components within normal limits   D DIMER - Abnormal; Notable for the following:     D-dimer 2.96 (*)     All other components within normal limits   METABOLIC PANEL, BASIC - Abnormal; Notable for the following:     Potassium 3.4 (*)     BUN 24 (*)     Creatinine 1.55 (*)     GFR est AA 57 (*)     GFR est non-AA 47 (*)     All other components within normal limits   GLUCOSE, POC - Abnormal; Notable for the following:     Glucose (POC) 101 (*)     All other components within normal limits   GLUCOSE, POC - Abnormal; Notable for the following:     Glucose (POC) 122 (*)     All other components within normal limits   GLUCOSE, POC - Abnormal; Notable for the following:     Glucose (POC) 102 (*)     All other components within normal limits   CBC WITH AUTOMATED DIFF   CK W/ REFLX CKMB   DRUG SCREEN, URINE   LIPID PANEL   HEMOGLOBIN A1C WITH EAG   MAGNESIUM   PHOSPHORUS     Admission on 04/15/2018   Component Date Value Ref Range Status    Ventricular Rate 04/15/2018 0  BPM Preliminary    Atrial Rate 04/15/2018 0 BPM Preliminary    QRS Duration 04/15/2018 0  ms Preliminary    Q-T Interval 04/15/2018 0  ms Preliminary    QTC Calculation (Bezet) 04/15/2018 0  ms Preliminary    Calculated R Axis 04/15/2018 0  degrees Preliminary    Calculated T Axis 04/15/2018 0  degrees Preliminary    Diagnosis 04/15/2018    Preliminary                    Value:** No QRS complexes found, no ECG analysis possible **  When compared with ECG of 14-JUL-2017 05:30,  Current undetermined rhythm precludes rhythm comparison, needs review      WBC 04/15/2018 9.9  4.1 - 11.1 K/uL Final    RBC 04/15/2018 4.94  4.10 - 5.70 M/uL Final    HGB 04/15/2018 16.7  12.1 - 17.0 g/dL Final    HCT 04/15/2018 46.9  36.6 - 50.3 % Final    MCV 04/15/2018 94.9  80.0 - 99.0 FL Final    MCH 04/15/2018 33.8  26.0 - 34.0 PG Final    MCHC 04/15/2018 35.6  30.0 - 36.5 g/dL Final    RDW 04/15/2018 11.7  11.5 - 14.5 % Final    PLATELET 93/15/4309 241  150 - 400 K/uL Final    MPV 04/15/2018 10.2  8.9 - 12.9 FL Final    NRBC 04/15/2018 0.0  0  WBC Final    ABSOLUTE NRBC 04/15/2018 0.00  0.00 - 0.01 K/uL Final    NEUTROPHILS 04/15/2018 69  32 - 75 % Final    LYMPHOCYTES 04/15/2018 22  12 - 49 % Final    MONOCYTES 04/15/2018 5  5 - 13 % Final    EOSINOPHILS 04/15/2018 2  0 - 7 % Final    BASOPHILS 04/15/2018 1  0 - 1 % Final    IMMATURE GRANULOCYTES 04/15/2018 0  0.0 - 0.5 % Final    ABS. NEUTROPHILS 04/15/2018 6.8  1.8 - 8.0 K/UL Final    ABS. LYMPHOCYTES 04/15/2018 2.2  0.8 - 3.5 K/UL Final    ABS. MONOCYTES 04/15/2018 0.5  0.0 - 1.0 K/UL Final    ABS. EOSINOPHILS 04/15/2018 0.2  0.0 - 0.4 K/UL Final    ABS. BASOPHILS 04/15/2018 0.1  0.0 - 0.1 K/UL Final    ABS. IMM.  GRANS. 04/15/2018 0.0  0.00 - 0.04 K/UL Final    DF 04/15/2018 AUTOMATED    Final    Sodium 04/15/2018 137  136 - 145 mmol/L Final    Potassium 04/15/2018 3.8  3.5 - 5.1 mmol/L Final    Chloride 04/15/2018 103  97 - 108 mmol/L Final    CO2 04/15/2018 24  21 - 32 mmol/L Final  Anion gap 04/15/2018 10  5 - 15 mmol/L Final    Glucose 04/15/2018 93  65 - 100 mg/dL Final    BUN 04/15/2018 13  6 - 20 MG/DL Final    Creatinine 04/15/2018 1.28  0.70 - 1.30 MG/DL Final    BUN/Creatinine ratio 04/15/2018 10* 12 - 20   Final    GFR est AA 04/15/2018 >60  >60 ml/min/1.73m2 Final    GFR est non-AA 04/15/2018 58* >60 ml/min/1.73m2 Final    Calcium 04/15/2018 8.7  8.5 - 10.1 MG/DL Final    Bilirubin, total 04/15/2018 0.9  0.2 - 1.0 MG/DL Final    ALT (SGPT) 04/15/2018 101* 12 - 78 U/L Final    AST (SGOT) 04/15/2018 76* 15 - 37 U/L Final    Alk.  phosphatase 04/15/2018 121* 45 - 117 U/L Final    Protein, total 04/15/2018 8.7* 6.4 - 8.2 g/dL Final    Albumin 04/15/2018 3.7  3.5 - 5.0 g/dL Final    Globulin 04/15/2018 5.0* 2.0 - 4.0 g/dL Final    A-G Ratio 04/15/2018 0.7* 1.1 - 2.2   Final    Troponin-I, Qt. 04/15/2018 0.69* <0.05 ng/mL Final    CK 04/15/2018 125  39 - 308 U/L Final    D-dimer 04/15/2018 2.96* 0.00 - 0.65 mg/L FEU Final    AMPHETAMINES 04/15/2018 NEGATIVE   NEG   Final    BARBITURATES 04/15/2018 NEGATIVE   NEG   Final    BENZODIAZEPINES 04/15/2018 NEGATIVE   NEG   Final    COCAINE 04/15/2018 NEGATIVE   NEG   Final    METHADONE 04/15/2018 NEGATIVE   NEG   Final    OPIATES 04/15/2018 NEGATIVE   NEG   Final    PCP(PHENCYCLIDINE) 04/15/2018 NEGATIVE   NEG   Final    THC (TH-CANNABINOL) 04/15/2018 NEGATIVE   NEG   Final    Drug screen comment 04/15/2018 (NOTE)   Final    Glucose (POC) 04/15/2018 101* 65 - 100 mg/dL Final    Performed by 04/15/2018 Bob Harry  PCT   Final    Glucose (POC) 04/15/2018 122* 65 - 100 mg/dL Final    Performed by 04/15/2018 Daphne Grant   Final    LIPID PROFILE 04/16/2018        Final    Cholesterol, total 04/16/2018 154  <200 MG/DL Final    Triglyceride 04/16/2018 53  <150 MG/DL Final    HDL Cholesterol 04/16/2018 80  MG/DL Final    LDL, calculated 04/16/2018 63.4  0 - 100 MG/DL Final    VLDL, calculated 04/16/2018 10.6 MG/DL Final    CHOL/HDL Ratio 04/16/2018 1.9  0 - 5.0   Final    Hemoglobin A1c 04/16/2018 5.3  4.2 - 6.3 % Final    Est. average glucose 04/16/2018 105  mg/dL Final    Sodium 04/16/2018 137  136 - 145 mmol/L Final    Potassium 04/16/2018 3.4* 3.5 - 5.1 mmol/L Final    Chloride 04/16/2018 103  97 - 108 mmol/L Final    CO2 04/16/2018 22  21 - 32 mmol/L Final    Anion gap 04/16/2018 12  5 - 15 mmol/L Final    Glucose 04/16/2018 94  65 - 100 mg/dL Final    BUN 04/16/2018 24* 6 - 20 MG/DL Final    Creatinine 04/16/2018 1.55* 0.70 - 1.30 MG/DL Final    BUN/Creatinine ratio 04/16/2018 15  12 - 20   Final    GFR est AA 04/16/2018 57* >60 ml/min/1.73m2 Final    GFR est non-AA 04/16/2018 47* >60 ml/min/1.73m2 Final    Calcium 04/16/2018 8.7  8.5 - 10.1 MG/DL Final    Magnesium 04/16/2018 2.2  1.6 - 2.4 mg/dL Final    Phosphorus 04/16/2018 4.4  2.6 - 4.7 MG/DL Final    Glucose (POC) 04/16/2018 102* 65 - 100 mg/dL Final    Performed by 04/16/2018 Ruben Rahman   Final        RADIOLOGY REPORTS:    Results from Hospital Encounter encounter on 04/15/18   XR CHEST PA LAT   Narrative INDICATION: SOB/Chest pain    EXAM: CXR 2 Views. COMPARISON: 7/13/2017. FINDINGS: Frontal and lateral views of the chest show the lungs are free of  acute disease. Heart size is borderline large. There is no overt pulmonary  edema. There is no evident pneumothorax, adenopathy or pleural effusion. Impression IMPRESSION: No acute disease. No significant interval change. Xr Chest Pa Lat    Result Date: 4/15/2018  INDICATION: SOB/Chest pain EXAM: CXR 2 Views. COMPARISON: 7/13/2017. FINDINGS: Frontal and lateral views of the chest show the lungs are free of acute disease. Heart size is borderline large. There is no overt pulmonary edema. There is no evident pneumothorax, adenopathy or pleural effusion. IMPRESSION: No acute disease. No significant interval change.               MEDICATIONS       ALL MEDICATIONS  Current Facility-Administered Medications   Medication Dose Route Frequency    potassium chloride SR (KLOR-CON 10) tablet 40 mEq  40 mEq Oral NOW    amLODIPine (NORVASC) tablet 10 mg  10 mg Oral DAILY    aspirin delayed-release tablet 81 mg  81 mg Oral DAILY    atorvastatin (LIPITOR) tablet 5 mg  5 mg Oral QHS    HYDROcodone-acetaminophen (NORCO) 5-325 mg per tablet 1-2 Tab  1-2 Tab Oral Q6H PRN    sodium chloride (NS) flush 5-10 mL  5-10 mL IntraVENous Q8H    sodium chloride (NS) flush 5-10 mL  5-10 mL IntraVENous PRN    enoxaparin (LOVENOX) injection 40 mg  40 mg SubCUTAneous Q24H    nicotine (NICODERM CQ) 14 mg/24 hr patch 1 Patch  1 Patch TransDERmal Q24H    ondansetron (ZOFRAN) injection 4 mg  4 mg IntraVENous Q4H PRN    insulin lispro (HUMALOG) injection   SubCUTAneous AC&HS    glucose chewable tablet 16 g  4 Tab Oral PRN    dextrose (D50W) injection syrg 12.5-25 g  12.5-25 g IntraVENous PRN    glucagon (GLUCAGEN) injection 1 mg  1 mg IntraMUSCular PRN    carvedilol (COREG) tablet 25 mg  25 mg Oral BID WITH MEALS    lisinopril (PRINIVIL, ZESTRIL) tablet 40 mg  40 mg Oral DAILY      SCHEDULED MEDICATIONS  Current Facility-Administered Medications   Medication Dose Route Frequency    potassium chloride SR (KLOR-CON 10) tablet 40 mEq  40 mEq Oral NOW    amLODIPine (NORVASC) tablet 10 mg  10 mg Oral DAILY    aspirin delayed-release tablet 81 mg  81 mg Oral DAILY    atorvastatin (LIPITOR) tablet 5 mg  5 mg Oral QHS    sodium chloride (NS) flush 5-10 mL  5-10 mL IntraVENous Q8H    enoxaparin (LOVENOX) injection 40 mg  40 mg SubCUTAneous Q24H    nicotine (NICODERM CQ) 14 mg/24 hr patch 1 Patch  1 Patch TransDERmal Q24H    insulin lispro (HUMALOG) injection   SubCUTAneous AC&HS    carvedilol (COREG) tablet 25 mg  25 mg Oral BID WITH MEALS    lisinopril (PRINIVIL, ZESTRIL) tablet 40 mg  40 mg Oral DAILY                ASSESSMENT & PLAN        The patient John Mendez is a 56 y.o.  male who presents at this time for treatment of the following diagnoses:  Patient Active Hospital Problem List:   Hypertensive urgency (4/15/2018)    Assessment: as per medical team    Plan: as above   Tobacco abuse (7/13/2017)    Assessment: as above    Plan: as above   Nonischemic dilated cardiomyopathy ([facility] (7/15/2017)    Assessment: as aove    Plan: as above   Shortness of breath (4/15/2018)    Assessment: as above    Plan: as above  Psychiatric status:    A/P: Some anxiety related to medical condition. Not depressed. Credible re history. Possibility of some stress driving coming to ED today but may be about reaching point where he felt worried and scared enough to seek help. Psychologic sx secondary to health problems            Will follow in hospital.  Likely discharge when medically stable.  :        A coordinated, multidisplinary treatment team round was conducted with the patient; that includes the nurse, unit pharmcist,  and writer all present. The following regarding medications was addressed during rounds with patient:   the risks and benefits of the proposed medication. The patient was given the opportunity to ask questions. Informed consent given to the use of the above medications. I will continue to adjust psychiatric and non-psychiatric medications (see above \"medication\" section and orders section for details) as deemed appropriate & based upon diagnoses and response to treatment. I have reviewed admission (and previous/old) labs and medical tests in the EHR and or transferring hospital documents. I will continue to order blood tests/labs and diagnostic tests as deemed appropriate and review results as they become available (see orders for details). I have reviewed old psychiatric and medical records available in the EHR.  I Will order additional psychiatric records from other institutions to further elucidate the nature of patient's psychopathology and review once available. I will gather additional collateral information from friends, family and o/p treatment team to further elucidate the nature of patient's psychopathology and baselline level of psychiatric functioning.                      SIGNED:    Tyrell Noonan MD  4/16/2018

## 2018-04-16 NOTE — CONSULTS
Cardiac Electrophysiology Note     Subjective:      Charlie Wilson is a 56 y.o. patient with hospital consult order for evaluation of cardiomyopathy LVEF 20%). Admitted after awakening with SOB/difficulty taking a full breath. Overnight, he states he was actually able to get some sleep. Early in the night, he had an episode of anxiety, but this resolved. Denies chest pain or current shortness of breath, able to take deep breaths without difficulty. He has tolerated medications without difficulty. Echocardiogram has been ordered, but not yet performed. SBP ranging 130s-150s today. Previous:  Cardiomyopathy previously noted on echo and cardiac cath in 7/2017 (Dr Tonya Garcia at [facility]) with troponin 0.99. No significant CAD then. GDMT started 07/2017 x 2 months. Discontinued due to job loss & \"slower reaction time\". Has not followed up with Dr. Tonya Garcia, does not wish to. No syncope. Echo (07/2017) with LVEF 15-20% and mild to moderate MR  ECG with LVH and repolarization abnormality     Previous smoker, has since quit. No family history of ICD or CHF.       Patient Active Problem List   Diagnosis Code    ACS (acute coronary syndrome) ([facility] [address] I24.9    Essential hypertension I10    Tobacco abuse Z72.0    NICM (nonischemic cardiomyopathy) ([facility] [address] I42.8    S/P cardiac cath Z98.890    Nonischemic dilated cardiomyopathy (HCC) I42.0    Shortness of breath R06.02    Hypertensive urgency I16.0     Current Facility-Administered Medications   Medication Dose Route Frequency Provider Last Rate Last Dose    amLODIPine (NORVASC) tablet 10 mg  10 mg Oral DAILY Anayeli Connor MD   10 mg at 04/16/18 0913    aspirin delayed-release tablet 81 mg  81 mg Oral DAILY Anayeli Connor MD   81 mg at 04/16/18 0913    atorvastatin (LIPITOR) tablet 5 mg  5 mg Oral QHS Anayeli Connor MD   5 mg at 04/15/18 2154    HYDROcodone-acetaminophen (NORCO) 5-325 mg per tablet 1-2 Tab  1-2 Tab Oral Q6H PRN Wilda Hoyt MD        sodium chloride (NS) flush 5-10 mL  5-10 mL IntraVENous Q8H Wilda Hoyt MD   10 mL at 04/16/18 0600    sodium chloride (NS) flush 5-10 mL  5-10 mL IntraVENous PRN Wilda Hoyt MD        enoxaparin (LOVENOX) injection 40 mg  40 mg SubCUTAneous Q24H Wilda Hoyt MD   40 mg at 04/15/18 1239    nicotine (NICODERM CQ) 14 mg/24 hr patch 1 Patch  1 Patch TransDERmal Q24H Wilda Hoyt MD   1 Patch at 04/15/18 1212    ondansetron (ZOFRAN) injection 4 mg  4 mg IntraVENous Q4H PRN Wilda Hoyt MD        insulin lispro (HUMALOG) injection   SubCUTAneous AC&HS Wilda Hoyt MD   Stopped at 04/15/18 1630    glucose chewable tablet 16 g  4 Tab Oral PRN Wilda Hoyt MD        dextrose (D50W) injection syrg 12.5-25 g  12.5-25 g IntraVENous PRN Wilda Hoyt MD        glucagon [facility]) injection 1 mg  1 mg IntraMUSCular PRN Wilda Hoyt MD        carvedilol (COREG) tablet 25 mg  25 mg Oral BID WITH MEALS Kallie Mauro MD   25 mg at 04/16/18 0912    lisinopril (PRINIVIL, ZESTRIL) tablet 40 mg  40 mg Oral DAILY Kallie Mauro MD   40 mg at 04/16/18 0913     No Known Allergies  Past Medical History:   Diagnosis Date    Diabetes [facility]     Heart failure ([facility]     Hypertension      Past Surgical History:   Procedure Laterality Date    HX KNEE REPLACEMENT      left       Social History   Substance Use Topics    Smoking status: Current Every Day Smoker     Packs/day: 1.00    Smokeless tobacco: Never Used    Alcohol use Yes      Comment: 2 beers         Review of Systems:   Constitutional: Negative for fever, chills, weight loss, malaise/fatigue. + occasional anxiety  HEENT: Negative for nosebleeds, vision changes. Respiratory: Negative for cough, hemoptysis  Cardiovascular: Negative for chest pain, palpitations, orthopnea, claudication, leg swelling, syncope, and PND.    Gastrointestinal: Negative for nausea, vomiting, diarrhea, blood in stool and melena. Genitourinary: Negative for dysuria, and hematuria. Musculoskeletal: Negative for myalgias, arthralgia. Skin: Negative for rash. Heme: Does not bleed or bruise easily. Neurological: Negative for speech change and focal weakness     Objective:     Visit Vitals    /84    Pulse 89    Temp 98.1 °F (36.7 °C)    Resp 20    Ht 6' 4\" (1.93 m)    Wt 95.3 kg (210 lb)    SpO2 97%    BMI 25.56 kg/m2      Physical Exam:   Constitutional: well-developed and well-nourished. No respiratory distress. Head: Normocephalic and atraumatic. Eyes: Pupils are equal, round  ENT: hearing normal  Neck: supple. No JVD present. Cardiovascular: Normal rate, regular rhythm. Exam reveals no gallop and no friction rub. No murmur heard. Pulmonary/Chest: Effort normal and breath sounds normal. No wheezes. Nitropaste in place. Abdominal: Soft, no tenderness. Musculoskeletal: no edema. Neurological: alert,oriented. Skin: Skin is warm and dry. Psychiatric: normal mood and affect. Behavior is normal. Judgment and thought content normal.           Assessment/Plan:   Hypertension urgency  Chronic systolic CHF NYHA class 1-2  Non ischemic cardiomyopathy  Known troponin elevation in the past and now-not NSTEMI. Previous tobacco abuse. Loss of job and insurance, so ran out of meds. Awaiting current echo & results. Continue carvedilol, lisinopril, & nitro paste. Addendum from EP attending:  I  have seen, examined patient, and discussed with nurse practitioner, registered nurse, reviewed, updated note and agree with the assessment and plan    I have talked to him this pm. He is feeling fine,  Concerns that he has to wait for echo, vascular duplex, SILVERIO  Vital signs as above  Exam shows regular rhythm and no rub  Lungs clear  Assessment and Plan:  Creatinine iman, continue with lisinopril  norvasc was added. No aldactone  May stop nitroglycerin.   It would be good to have BIDIL/esntresto for him but cannot afford    Not currently a candidate for ICD placement. He must be compliant with GDMT x 3 months in order to be considered (assuming LVEF is still <35% at that time). Discussed importance of medication compliance. States he can afford medications now that he has quit smoking. Encouraged him to call for any undesirable side effects so that medications may be changed/titrated if necessary, rather than patient just stopping. Post-discharge, plan to follow in clinic. He agrees to come to clinic to follow up with me and then laer In 3 months, reassess with echo. I will get him help with care card when he comes to office for follow up later. Thank you for involving me in this patient's care and please call with further concerns or questions. Suzie Ashford M.D.   Electrophysiology/Cardiology  [facility]  [address], [address] [place], [place] [address]  [phone]

## 2018-04-16 NOTE — TELEPHONE ENCOUNTER
----- Message from Isidro Anderson MD sent at 4/15/2018 11:07 AM EDT -----  Seen at [facility]  Need appt with me or Salome Arreola next week Thursday for follow up then we get ld to help with care card and meds  Will need to be seen again after that 3 months with echo for possible ICD

## 2018-04-16 NOTE — PROGRESS NOTES
Hospitalist Progress Note  Lisbeth Hdez NP  Answering service: [phone] OR [phone] from in house phone  Cell: [phone]      Date of Service:  2018  NAME:  Ramy Henry  :  1960  MRN:  [id number]      Admission Summary:   56 y.o man with a hx of non-ischemic cardiomyopathy and HTN who presents with dyspnea. Symptoms began about 2 days ago when he noted progressively worsening dyspnea at rest and especially with exertion. Symptoms are worsened with activity and rest provides some relief. He denies associated chest pain, PND, orthopnea, or leg swelling, but states he's had concomitant anxiety/panic attacks that sometimes awaken him from sleep. He stopped taking his medications for the past 2 months because they \"they make me feel bad and I can't work with them,\" though he doesn't elaborate any more. He had a cardiac catheterization about a year ago which showed normal coronaries but an LVEF of 20%. Interval history / Subjective:     Patient reports feeling much better today. No chest pain or shortness of breath. Discussed importance of taking home medications and following up with providers outpatient, voices understanding. Continues to report pain in bottom of left foot that comes and goes. Assessment & Plan:     Hypertensive urgency: (POA) likely due to med non-compliance; he stopped taking his meds 2 months ago  -BP better controlled today  -resume home meds including amlodipine, lisinopril, and carvedilol  -consult case management  -monitor     Non-ischemic cardiomyopathy with Chronic Systolic CHF NYHA class 1-2 on admission: (POA)  -does not appear to be in acute exacerbation  -resume lisinopril and Coreg as above  -check echo  - echo showing EF of 20%  -troponin 0.69 but he denies chest pain and in the setting of uncontrolled HTN this is not surprising.  Cardiology consulted by the ED, will f/u recs     Bilateral leg pain: (POA) this is chronic but worsened recently  -check LE dopplers to r/o DVT  -PVD/claudications? Check SILVERIO's     Type 2 DM:   -POC checks and SSI  -hgb a1c 5.3  -lipid panel ok     Panic attacks, irritability: (POA) UDS negative  -consult psychiatry    Hypokalemia  -replenished  -monitor    ROLANDO  -likely from HTN   -creat up to 1.55 today  -repeat labs in am     Tobacco Abuse  -stopped smoking 3 days prior to admission   -encouraged continued smoking cessation   -nicotine patch     Code status: Full  DVT prophylaxis: Lovenox    Care Plan discussed with: Patient/Family and Nurse  Disposition: Home w/Family and TBD, Hopefully home tomorrow     Hospital Problems  Never Reviewed          Codes Class Noted POA    Shortness of breath ICD-10-CM: R06.02  ICD-9-CM: 786.05  4/15/2018 Unknown        * (Principal)Hypertensive urgency ICD-10-CM: I16.0  ICD-9-CM: 401.9  4/15/2018 Unknown        Nonischemic dilated cardiomyopathy ([facility] ICD-10-CM: I42.0  ICD-9-CM: 425.4  7/15/2017 Yes        Tobacco abuse ICD-10-CM: Z72.0  ICD-9-CM: 305.1  7/13/2017 Yes                Review of Systems:   A comprehensive review of systems was negative except for that written in the HPI. Vital Signs:    Last 24hrs VS reviewed since prior progress note. Most recent are:  Visit Vitals    /84    Pulse 89    Temp 98.1 °F (36.7 °C)    Resp 20    Ht 6' 4\" (1.93 m)    Wt 95.3 kg (210 lb)    SpO2 97%    BMI 25.56 kg/m2         Intake/Output Summary (Last 24 hours) at 04/16/18 0944  Last data filed at 04/16/18 0920   Gross per 24 hour   Intake                0 ml   Output             1300 ml   Net            -1300 ml        Physical Examination:             Constitutional:  No acute distress, cooperative, pleasant    ENT:  Oral mucous moist, oropharynx benign. Resp:  CTA bilaterally. No wheezing/rhonchi/rales.  No accessory muscle use   CV:  Regular rhythm, normal rate, no murmurs, gallops, rubs    GI:  Soft, non distended, non tender. normoactive bowel sounds,     Musculoskeletal:  No edema, warm, 2+ pulses throughout. Left leg cooler to touch    Neurologic:  Moves all extremities. AAOx3, follows commands     Psych:  Good insight, Not anxious nor agitated. Data Review:    Review and/or order of clinical lab test  Review and/or order of tests in the radiology section of [facility]  Review and/or order of tests in the medicine section of [facility]      Labs:     Recent Labs      04/15/18   0652   WBC  9.9   HGB  16.7   HCT  46.9   PLT  232     Recent Labs      04/16/18   0352  04/15/18   0652   NA  137  137   K  3.4*  3.8   CL  103  103   CO2  22  24   BUN  24*  13   CREA  1.55*  1.28   GLU  94  93   CA  8.7  8.7   MG  2.2   --    PHOS  4.4   --      Recent Labs      04/15/18   0652   SGOT  76*   ALT  101*   AP  121*   TBILI  0.9   TP  8.7*   ALB  3.7   GLOB  5.0*     No results for input(s): INR, PTP, APTT in the last 72 hours. No lab exists for component: INREXT   No results for input(s): FE, TIBC, PSAT, FERR in the last 72 hours. No results found for: FOL, RBCF   No results for input(s): PH, PCO2, PO2 in the last 72 hours.   Recent Labs      04/15/18   0652   TROIQ  0.69*     Lab Results   Component Value Date/Time    Cholesterol, total 154 04/16/2018 03:52 AM    HDL Cholesterol 80 04/16/2018 03:52 AM    LDL, calculated 63.4 04/16/2018 03:52 AM    Triglyceride 53 04/16/2018 03:52 AM    CHOL/HDL Ratio 1.9 04/16/2018 03:52 AM     Lab Results   Component Value Date/Time    Glucose (POC) 102 (H) 04/16/2018 06:34 AM    Glucose (POC) 122 (H) 04/15/2018 09:53 PM    Glucose (POC) 101 (H) 04/15/2018 04:24 PM    Glucose (POC) 126 (H) 12/08/2017 05:09 AM    Glucose (POC) 96 07/14/2017 11:19 AM    Glucose (POC) 96 07/14/2017 07:29 AM     Lab Results   Component Value Date/Time    Color DARK YELLOW 07/13/2017 07:45 PM    Appearance CLEAR 07/13/2017 07:45 PM    Specific gravity 1.022 07/13/2017 07:45 PM    pH (UA) 5.0 07/13/2017 07:45 PM    Protein TRACE (A) 07/13/2017 07:45 PM    Glucose NEGATIVE  07/13/2017 07:45 PM    Ketone TRACE (A) 07/13/2017 07:45 PM    Urobilinogen 1.0 07/13/2017 07:45 PM    Nitrites NEGATIVE  07/13/2017 07:45 PM    Leukocyte Esterase NEGATIVE  07/13/2017 07:45 PM    Epithelial cells FEW 07/13/2017 07:45 PM    Bacteria NEGATIVE  07/13/2017 07:45 PM    WBC 0-4 07/13/2017 07:45 PM    RBC 0-5 07/13/2017 07:45 PM         Medications Reviewed:     Current Facility-Administered Medications   Medication Dose Route Frequency    amLODIPine (NORVASC) tablet 10 mg  10 mg Oral DAILY    aspirin delayed-release tablet 81 mg  81 mg Oral DAILY    atorvastatin (LIPITOR) tablet 5 mg  5 mg Oral QHS    HYDROcodone-acetaminophen (NORCO) 5-325 mg per tablet 1-2 Tab  1-2 Tab Oral Q6H PRN    sodium chloride (NS) flush 5-10 mL  5-10 mL IntraVENous Q8H    sodium chloride (NS) flush 5-10 mL  5-10 mL IntraVENous PRN    enoxaparin (LOVENOX) injection 40 mg  40 mg SubCUTAneous Q24H    nicotine (NICODERM CQ) 14 mg/24 hr patch 1 Patch  1 Patch TransDERmal Q24H    ondansetron (ZOFRAN) injection 4 mg  4 mg IntraVENous Q4H PRN    insulin lispro (HUMALOG) injection   SubCUTAneous AC&HS    glucose chewable tablet 16 g  4 Tab Oral PRN    dextrose (D50W) injection syrg 12.5-25 g  12.5-25 g IntraVENous PRN    glucagon (GLUCAGEN) injection 1 mg  1 mg IntraMUSCular PRN    carvedilol (COREG) tablet 25 mg  25 mg Oral BID WITH MEALS    lisinopril (PRINIVIL, ZESTRIL) tablet 40 mg  40 mg Oral DAILY     ______________________________________________________________________  EXPECTED LENGTH OF STAY: - - -  ACTUAL LENGTH OF STAY:          [facility], NP

## 2018-04-16 NOTE — PROGRESS NOTES
Problem: Falls - Risk of  Goal: *Absence of Falls  Document Nettie Fall Risk and appropriate interventions in the flowsheet. Outcome: Progressing Towards Goal  Fall Risk Interventions:  Mobility Interventions: Communicate number of staff needed for ambulation/transfer, Patient to call before getting OOB         Medication Interventions: Evaluate medications/consider consulting pharmacy, Patient to call before getting OOB, Teach patient to arise slowly    Elimination Interventions: Call light in reach, Patient to call for help with toileting needs, Urinal in reach             Problem: Hypertension  Goal: *Blood pressure within specified parameters  Outcome: Progressing Towards Goal  Pt's BP has been stable. Pt on carvedilol, lisinopril, and amlodipine. Bedside shift change report given to Manoj Jimenez (oncoming nurse) by Lucian Costello RN (offgoing nurse). Report included the following information SBAR, Kardex, Intake/Output, MAR, Recent Results and Cardiac Rhythm NSR.

## 2018-04-16 NOTE — LETTER
4/19/2018 3:43 PM 
 
Mr. Luciano Sandra 
[address] We have been trying to reach you by phone but have been unsuccessful. You have a hospital follow up appointment with the Dr Zena Cisneros on 4/24/18 at 1:40pm.  Please contact our office as Dr Abigail Badillo would also like to schedule an Echo to be done in our office. Please contact us back at [phone].    
 
 
 
 
 
 
 
Sincerely, 
 
 
Susan Perez MD

## 2018-04-16 NOTE — PROGRESS NOTES
Problem: Falls - Risk of  Goal: *Absence of Falls  Document Nettie Fall Risk and appropriate interventions in the flowsheet.    Outcome: Progressing Towards Goal  Fall Risk Interventions:  Mobility Interventions: Communicate number of staff needed for ambulation/transfer, Patient to call before getting OOB         Medication Interventions: Evaluate medications/consider consulting pharmacy, Patient to call before getting OOB, Teach patient to arise slowly    Elimination Interventions: Call light in reach, Elevated toilet seat, Patient to call for help with toileting needs

## 2018-04-17 VITALS
HEART RATE: 71 BPM | SYSTOLIC BLOOD PRESSURE: 121 MMHG | BODY MASS INDEX: 25.57 KG/M2 | RESPIRATION RATE: 16 BRPM | HEIGHT: 76 IN | TEMPERATURE: 97.4 F | DIASTOLIC BLOOD PRESSURE: 81 MMHG | OXYGEN SATURATION: 97 % | WEIGHT: 210 LBS

## 2018-04-17 PROBLEM — E87.6 HYPOKALEMIA: Status: ACTIVE | Noted: 2018-04-17

## 2018-04-17 PROBLEM — I50.22 CHRONIC SYSTOLIC HEART FAILURE (HCC): Status: ACTIVE | Noted: 2018-04-17

## 2018-04-17 LAB
ANION GAP SERPL CALC-SCNC: 7 MMOL/L (ref 5–15)
BUN SERPL-MCNC: 29 MG/DL (ref 6–20)
BUN/CREAT SERPL: 23 (ref 12–20)
CALCIUM SERPL-MCNC: 9 MG/DL (ref 8.5–10.1)
CHLORIDE SERPL-SCNC: 106 MMOL/L (ref 97–108)
CO2 SERPL-SCNC: 23 MMOL/L (ref 21–32)
CREAT SERPL-MCNC: 1.27 MG/DL (ref 0.7–1.3)
GLUCOSE BLD STRIP.AUTO-MCNC: 90 MG/DL (ref 65–100)
GLUCOSE SERPL-MCNC: 85 MG/DL (ref 65–100)
POTASSIUM SERPL-SCNC: 3.8 MMOL/L (ref 3.5–5.1)
SERVICE CMNT-IMP: NORMAL
SODIUM SERPL-SCNC: 136 MMOL/L (ref 136–145)

## 2018-04-17 PROCEDURE — 74011250637 HC RX REV CODE- 250/637: Performed by: INTERNAL MEDICINE

## 2018-04-17 PROCEDURE — 36415 COLL VENOUS BLD VENIPUNCTURE: CPT | Performed by: NURSE PRACTITIONER

## 2018-04-17 PROCEDURE — 82962 GLUCOSE BLOOD TEST: CPT

## 2018-04-17 PROCEDURE — 80048 BASIC METABOLIC PNL TOTAL CA: CPT | Performed by: NURSE PRACTITIONER

## 2018-04-17 RX ORDER — CARVEDILOL 25 MG/1
25 TABLET ORAL 2 TIMES DAILY WITH MEALS
Qty: 60 TAB | Refills: 0 | Status: SHIPPED | OUTPATIENT
Start: 2018-04-17

## 2018-04-17 RX ORDER — AMLODIPINE BESYLATE 10 MG/1
10 TABLET ORAL DAILY
Qty: 30 TAB | Refills: 0 | Status: SHIPPED | OUTPATIENT
Start: 2018-04-17

## 2018-04-17 RX ORDER — ASPIRIN 81 MG/1
81 TABLET ORAL DAILY
Qty: 30 TAB | Refills: 0 | Status: SHIPPED | OUTPATIENT
Start: 2018-04-17

## 2018-04-17 RX ORDER — ATORVASTATIN CALCIUM 10 MG/1
5 TABLET, FILM COATED ORAL
Qty: 30 TAB | Refills: 0 | Status: SHIPPED | OUTPATIENT
Start: 2018-04-17

## 2018-04-17 RX ORDER — LISINOPRIL 40 MG/1
40 TABLET ORAL DAILY
Qty: 30 TAB | Refills: 0 | Status: SHIPPED | OUTPATIENT
Start: 2018-04-17

## 2018-04-17 RX ADMIN — CARVEDILOL 25 MG: 12.5 TABLET, FILM COATED ORAL at 07:15

## 2018-04-17 NOTE — TELEPHONE ENCOUNTER
Per Dr Marisol Potter need echo same day as f/u appt. Order placed.  appt made with kissnofrog Anil on 4/24 at 1:40pm.

## 2018-04-17 NOTE — ROUTINE PROCESS
TRANSFER - OUT REPORT:    Verbal report given to kaylen rn(name) on Shante Ch  being transferred to 203 (unit) for routine progression of care       Report consisted of patients Situation, Background, Assessment and   Recommendations(SBAR). Information from the following report(s) SBAR, Kardex, ED Summary, [facility] and Cardiac Rhythm nsr was reviewed with the receiving nurse. Lines:   Peripheral IV 04/15/18 Left Antecubital (Active)   Site Assessment Clean, dry, & intact 4/16/2018  4:00 PM   Phlebitis Assessment 0 4/16/2018  4:00 PM   Infiltration Assessment 0 4/16/2018  4:00 PM   Dressing Status Clean, dry, & intact 4/16/2018  4:00 PM   Dressing Type Transparent;Tape 4/16/2018  4:00 PM   Hub Color/Line Status Pink 4/16/2018  4:00 PM   Action Taken Open ports on tubing capped 4/16/2018  4:00 PM   Alcohol Cap Used Yes 4/16/2018  4:00 PM        Opportunity for questions and clarification was provided.       Patient transported with:   Registered Nurse

## 2018-04-17 NOTE — DISCHARGE INSTRUCTIONS
Discharge Instructions       PATIENT ID: Stephanie Diez  MRN: [id number]   YOB: 1960    DATE OF ADMISSION: 4/15/2018  6:28 AM    DATE OF DISCHARGE: 4/17/2018    PRIMARY CARE PROVIDER: None     ATTENDING PHYSICIAN: Nicky Cao*  DISCHARGING PROVIDER: Morgan Mackay NP    To contact this individual call [phone] and ask the  to page. If unavailable ask to be transferred the Adult Hospitalist Department. DISCHARGE DIAGNOSES Hypertensive Urgency, Chronic Systolic Heart Failure, Acute Kidney Injury    CONSULTATIONS: IP CONSULT TO CARDIOLOGY  IP CONSULT TO CARDIOLOGY  IP CONSULT TO HOSPITALIST  IP CONSULT TO PSYCHIATRY    PROCEDURES/SURGERIES: * No surgery found *    PENDING TEST RESULTS:   At the time of discharge the following test results are still pending: none    FOLLOW UP APPOINTMENTS:   Follow-up Information     Follow up With Details Comments Contact Lani Avilez MD  follow up with cardiology in 2-3 weeks [address] [place] [address] [address]  [phone]           ADDITIONAL CARE RECOMMENDATIONS:   1. You have been prescribed the following new medications:  -Coreg and Lisinopril (blood pressure medications) to help with your heart failure.   -Lipitor (cholesterol lowering medication) and ASA for heart attack prevention/heart health. -Norvasc (blood pressure medication). See below for more information to include common side effects. 2. Follow up with cardiology and primary care. 3. Stop smoking. DIET: Cardiac    ACTIVITY: as tolerated    WOUND CARE: none    EQUIPMENT needed: none    DISCHARGE MEDICATIONS:   Amlodipine (Hypertenipine-2.5, Norvasc) - (By mouth)   Why this medicine is used:   Treats high blood pressure and angina (chest pain).   Contact a nurse or doctor right away if you have:  · Blistering, peeling, red skin rash  · Chest pain that may spread to your arms, jaw, back, or neck  · Trouble breathing  · Swelling in your hands, ankles, legs  · Unusual sweating, faintness     Common side effects:  · Tiredness, drowsiness  · Headache  © 2017 [facility] Information is for End User's use only and may not be sold, redistributed or otherwise used for commercial purposes. Atorvastatin (Lipitor) - (By mouth)   Why this medicine is used:   Treats high cholesterol and triglyceride levels. Reduces the risk of angina, stroke, heart attack, or certain heart and blood vessel problems. Contact a nurse or doctor right away if you have:  · Severe headache, confusion, trouble speaking  · Dark urine or pale stools  · Yellow skin or eyes  · Nausea, vomiting, loss of appetite, stomach pain  · Muscle pain, tenderness, or weakness; unusual tiredness     Common side effects:  · Diarrhea  · Joint pain  © 2017 [facility] Information is for End User's use only and may not be sold, redistributed or otherwise used for commercial purposes. Lisinopril (Prinivil, Zestril) - (By mouth)   Why this medicine is used:   Treats high blood pressure and heart failure. Contact a nurse or doctor right away if you have:  · Blistering, peeling, red skin rash  · Change in how much or how often you urinate  · Confusion, weakness, uneven heartbeat, trouble breathing  · Lightheadedness, dizziness, fainting  · Numbness or tingling in your hands, feet, or lips     Common side effects:  · Dry cough  © 2017 [facility] [facility] Information is for End User's use only and may not be sold, redistributed or otherwise used for commercial purposes. Carvedilol (Coreg, Coreg CR, Hypertenevide-12.5) - (By mouth)   Why this medicine is used:   Treats high blood pressure and heart failure.   Contact a nurse or doctor right away if you have:  · Change in how much or how often you urinate  · Leg pain when you walk, legs and feet that feel cold or numb  · Lightheadedness, dizziness, fainting  · Rapid weight gain, swelling in your hands, ankles, or feet     Common side effects:  · Mild dizziness  · Tiredness  · Trouble having sex  · Diarrhea  © 2017 [address] [employer] Information is for End User's use only and may not be sold, redistributed or otherwise used for commercial purposes. See Medication Reconciliation Form    · It is important that you take the medication exactly as they are prescribed. · Keep your medication in the bottles provided by the pharmacist and keep a list of the medication names, dosages, and times to be taken in your wallet. · Do not take other medications without consulting your doctor. NOTIFY YOUR PHYSICIAN FOR ANY OF THE FOLLOWING:   Fever over 101 degrees for 24 hours. Chest pain, shortness of breath, fever, chills, nausea, vomiting, diarrhea, change in mentation, falling, weakness, bleeding. Severe pain or pain not relieved by medications. Or, any other signs or symptoms that you may have questions about. DISPOSITION:  X  Home With:   OT  PT  HH  RN       SNF/Inpatient Rehab/LTAC    Independent/assisted living    Hospice    Other:       PROBLEM LIST Updated:   Yes X       Signed:   Talita Bonilla NP  4/17/2018  8:49 AM

## 2018-04-17 NOTE — PROGRESS NOTES
TRANSFER - IN REPORT:    Verbal report received from Anitha Almeida RN (name) on Boubacar Spencer  being received from [facility] (unit) for routine progression of care      Report consisted of patients Situation, Background, Assessment and   Recommendations(SBAR). Information from the following report(s) SBAR, Kardex and MAR was reviewed with the receiving nurse. Opportunity for questions and clarification was provided. Assessment completed upon patients arrival to unit and care assumed.

## 2018-04-17 NOTE — DISCHARGE SUMMARY
Discharge Summary       PATIENT ID: Joel Humphries  MRN: [id number]   YOB: 1960    DATE OF ADMISSION: 4/15/2018  6:28 AM    DATE OF DISCHARGE: 4/17/2048   PRIMARY CARE PROVIDER: None     ATTENDING PHYSICIAN: Charleen Harvey  DISCHARGING PROVIDER: Andree Kanner, NP    To contact this individual call [phone] and ask the  to page. If unavailable ask to be transferred the Adult Hospitalist Department. CONSULTATIONS: IP CONSULT TO CARDIOLOGY  IP CONSULT TO CARDIOLOGY    PROCEDURES/SURGERIES: * No surgery found *    ADMITTING DIAGNOSES & HOSPITAL COURSE:   Dx: Hypertensive Urgency, Chronic Systolic Heart Failure, Hypokalemia    56 y.o man with a hx of non-ischemic cardiomyopathy and HTN who presents with dyspnea. Symptoms began about 2 days ago when he noted progressively worsening dyspnea at rest and especially with exertion. Symptoms are worsened with activity and rest provides some relief. He denies associated chest pain, PND, orthopnea, or leg swelling, but states he's had concomitant anxiety/panic attacks that sometimes awaken him from sleep. He stopped taking his medications for the past 2 months because they \"they make me feel bad and I can't work with them,\" though he doesn't elaborate any more. He had a cardiac catheterization about a year ago which showed normal coronaries but an LVEF of 20%. Patient restart on previously prescribed home BP medications with improvement of BP and sxs. Patient seen by Dr Tarah Eli with cardiology, who recommended medical mgmt of heart failure and to f/u with him the office for continued management. Reviewed medications and s/e and purpose. Patient voiced understanding. Patient provided with care care application and set up with pcp appointment at [facility] [place] [place]. Patient stable for discharge home.      DISCHARGE DIAGNOSES / PLAN:      Hypertensive urgency: (POA) likely due to med non-compliance; he stopped taking his meds 2 months ago  -BP better controlled today  -resume home meds including amlodipine, lisinopril, and carvedilol      Non-ischemic cardiomyopathy with Chronic Systolic CHF NYHA class 1-2 on admission/ stage 1 on discharge: (POA)  -does not appear to be in acute exacerbation  -resume lisinopril and Coreg as above  -7/14 echo showing EF of 20%  -troponin 0.69 but he denies chest pain and in the setting of uncontrolled HTN this is not surprising. Cardiology following recommending med mgmt and OP.       Bilateral leg pain: (POA) this is chronic but worsened recently  - Check SILVERIO's-negative  -suspect neuropathy component   -f/u with pcp      Type 2 DM:   -hgb a1c 5.3  -lipid panel ok      Panic attacks, irritability: (POA) UDS negative  -resolved, OP f/u     Hypokalemia-resolved  -replenished     ROLANDO-resolved  -likely from HTN      Tobacco Abuse  -stopped smoking 3 days prior to admission   -encouraged continued smoking cessation   -nicotine patch       PENDING TEST RESULTS:   At the time of discharge the following test results are still pending: none    FOLLOW UP APPOINTMENTS:    Follow-up Information     Follow up With Details Comments Contact Info    Kira Friedman MD  follow up with cardiology in 2-3 weeks [address] [facility] [address] On 4/23/2018 Hospital f/u new PCP appointment (financial screening) on Monday, 4/23/28 @ 1:35 p.m followed by PCP @ 2:15 p.m. Patient needs to provide photo ID, proof of income and d/c summary. [address]           ADDITIONAL CARE RECOMMENDATIONS:   1. You have been prescribed the following new medications:  -Coreg and Lisinopril (blood pressure medications) to help with your heart failure.   -Lipitor (cholesterol lowering medication) and ASA for heart attack prevention/heart health. -Norvasc (blood pressure medication).    See below for more information to include common side effects. 2. Follow up with cardiology and primary care. 3. Stop smoking. DIET: Cardiac    ACTIVITY: as tolerated    WOUND CARE: none    EQUIPMENT needed: none      DISCHARGE MEDICATIONS:  Discharge Medication List as of 4/17/2018 10:29 AM      START taking these medications    Details   carvedilol (COREG) 25 mg tablet Take 1 Tab by mouth two (2) times daily (with meals). Indications: chronic heart failure, Print, Disp-60 Tab, R-0      lisinopril (PRINIVIL, ZESTRIL) 40 mg tablet Take 1 Tab by mouth daily. , Print, Disp-30 Tab, R-0         CONTINUE these medications which have CHANGED    Details   amLODIPine (NORVASC) 10 mg tablet Take 1 Tab by mouth daily. Indications: hypertension, Print, Disp-30 Tab, R-0      aspirin delayed-release 81 mg tablet Take 1 Tab by mouth daily. , Print, Disp-30 Tab, R-0      atorvastatin (LIPITOR) 10 mg tablet Take 0.5 Tabs by mouth nightly. , Print, Disp-30 Tab, R-0         STOP taking these medications       HYDROcodone-acetaminophen (NORCO) 5-325 mg per tablet Comments:   Reason for Stopping:                 NOTIFY YOUR PHYSICIAN FOR ANY OF THE FOLLOWING:   Fever over 101 degrees for 24 hours. Chest pain, shortness of breath, fever, chills, nausea, vomiting, diarrhea, change in mentation, falling, weakness, bleeding. Severe pain or pain not relieved by medications. Or, any other signs or symptoms that you may have questions about. DISPOSITION:   X Home With:   OT  PT  HH  RN       Long term SNF/Inpatient Rehab    Independent/assisted living    Hospice    Other:       PATIENT CONDITION AT DISCHARGE:     Functional status    Poor     Deconditioned    X Independent      Cognition   X  Lucid     Forgetful     Dementia      Catheters/lines (plus indication)    Collier     PICC     PEG    X None      Code status   X  Full code     DNR      PHYSICAL EXAMINATION AT DISCHARGE:  General: No acute distress, cooperative, pleasant   EENT: EOMI. Anicteric sclerae.  Oral mucous moist, oropharynx benign  Resp: CTA bilaterally. No wheezing/rhonchi/rales. No accessory muscle use  CV: Regular rhythm, normal rate, no murmurs, gallops, rubs  GI: Soft, non distended, non tender. normoactive bowel sounds,   Extremities: No edema, warm, 2+ pulses throughout  Neurologic: Moves all extremities. AAOx3,   Psych: Good insight. Not anxious nor agitated.   Skin: Good Turgor, no rashes or ulcers      CHRONIC MEDICAL DIAGNOSES:  Problem List as of 4/17/2018  Date Reviewed: 4/17/2018          Codes Class Noted - Resolved    Hypokalemia ICD-10-CM: E87.6  ICD-9-CM: 276.8  4/17/2018 - Present        Chronic systolic heart failure ([facility] ICD-10-CM: I50.22  ICD-9-CM: 428.22  4/17/2018 - Present        Shortness of breath ICD-10-CM: R06.02  ICD-9-CM: 786.05  4/15/2018 - Present        * (Principal)Hypertensive urgency ICD-10-CM: I16.0  ICD-9-CM: 401.9  4/15/2018 - Present        Nonischemic dilated cardiomyopathy ([facility] ICD-10-CM: I42.0  ICD-9-CM: 425.4  7/15/2017 - Present        NICM (nonischemic cardiomyopathy) ([facility] ICD-10-CM: I42.8  ICD-9-CM: 425.4  7/14/2017 - Present        S/P cardiac cath ICD-10-CM: Z98.890  ICD-9-CM: V45.89  7/14/2017 - Present    Overview Signed 7/14/2017  3:11 PM by Marianna Mcintosh NP     7/14/17: No significant CAD              ACS (acute coronary syndrome) ([facility] ICD-10-CM: I24.9  ICD-9-CM: 411.1  7/13/2017 - Present        Essential hypertension ICD-10-CM: I10  ICD-9-CM: 401.9  7/13/2017 - Present        Tobacco abuse ICD-10-CM: Z72.0  ICD-9-CM: 305.1  7/13/2017 - Present              Greater than 40 minutes were spent with the patient on counseling and coordination of care    Signed:   Morgan Mackay NP  4/17/2018  8:45 AM

## 2018-04-17 NOTE — PROGRESS NOTES
CM reviewed chart and received consult to assist with care card application and follow up appointment. Care Management Specialist has spoken with pt to set up a PCP appointment. [facility] will follow up for financial assistance screening. CM did provide a copy of the care card application. Pt has transportation and confirmed that he can afford his medications.     Cayetano Bustamante, KRISTINEW, ACM

## 2018-04-17 NOTE — PROGRESS NOTES
Cardiac Electrophysiology Note     Subjective:      Bean Serrato is a 56 y.o. patient with hospital consult order for evaluation of cardiomyopathy LVEF 20%). He has no complaints today, is eager for discharge home. No significant events on telemetry overnight. His creatinine was up yesterday (1.55), but has decreased today (1.27). BP has been well controlled. Nitropaste was discontinued yesterday. Amlodipine has been added. He has tolerated medications without difficulty. Echocardiogram has been ordered, but not yet performed. Previous:  Cardiomyopathy previously noted on echo and cardiac cath in 7/2017 (Dr Brooke Li at [facility]) with troponin 0.99. No significant CAD then. GDMT started 07/2017 x 2 months. Discontinued due to job loss & \"slower reaction time\". Has not followed up with Dr. Brooke Li, does not wish to. No syncope. Echo (07/2017) with LVEF 15-20% and mild to moderate MR  ECG with LVH and repolarization abnormality     Previous smoker, has since quit. No family history of ICD or CHF.       Patient Active Problem List   Diagnosis Code    ACS (acute coronary syndrome) ([facility] I24.9    Essential hypertension I10    Tobacco abuse Z72.0    NICM (nonischemic cardiomyopathy) ([facility] [facility] I42.8    S/P cardiac cath Z98.890    Nonischemic dilated cardiomyopathy (HCC) I42.0    Shortness of breath R06.02    Hypertensive urgency I16.0    Hypokalemia E87.6    Chronic systolic heart failure (HCC) I50.22     Current Facility-Administered Medications   Medication Dose Route Frequency Provider Last Rate Last Dose    amLODIPine (NORVASC) tablet 10 mg  10 mg Oral DAILY Maurilio Lyons MD   10 mg at 04/16/18 0913    aspirin delayed-release tablet 81 mg  81 mg Oral DAILY Maurilio Lyons MD   81 mg at 04/16/18 0913    atorvastatin (LIPITOR) tablet 5 mg  5 mg Oral QHS Maurilio Lyons MD   5 mg at 04/16/18 2153    HYDROcodone-acetaminophen (NORCO) 5-325 mg per tablet 1-2 Tab 1-2 Tab Oral Q6H PRN Usha Melendez MD        sodium chloride (NS) flush 5-10 mL  5-10 mL IntraVENous Q8H Usha Melendez MD   10 mL at 04/16/18 2200    sodium chloride (NS) flush 5-10 mL  5-10 mL IntraVENous PRN Usha Melendez MD        enoxaparin (LOVENOX) injection 40 mg  40 mg SubCUTAneous Q24H Usha Melendez MD   40 mg at 04/16/18 1237    nicotine (NICODERM CQ) 14 mg/24 hr patch 1 Patch  1 Patch TransDERmal Q24H Usha Melendez MD   1 Patch at 04/16/18 1239    ondansetron (ZOFRAN) injection 4 mg  4 mg IntraVENous Q4H PRN Usha Melendez MD        insulin lispro (HUMALOG) injection   SubCUTAneous AC&HS Usha Melendez MD   Stopped at 04/15/18 1630    glucose chewable tablet 16 g  4 Tab Oral PRN Usha Melendez MD        dextrose (D50W) injection syrg 12.5-25 g  12.5-25 g IntraVENous PRN Usha Melendez MD        glucagon [facility]) injection 1 mg  1 mg IntraMUSCular PRN Usha Melendez MD        carvedilol (COREG) tablet 25 mg  25 mg Oral BID WITH MEALS Creig Galeazzi, MD   25 mg at 04/17/18 0715    lisinopril (PRINIVIL, ZESTRIL) tablet 40 mg  40 mg Oral DAILY Creig Galeazzi, MD   40 mg at 04/16/18 0913     No Known Allergies  Past Medical History:   Diagnosis Date    Diabetes ([facility]     Heart failure ([facility] [facility]     Hypertension      Past Surgical History:   Procedure Laterality Date    HX KNEE REPLACEMENT      left       Social History   Substance Use Topics    Smoking status: Current Every Day Smoker     Packs/day: 1.00    Smokeless tobacco: Never Used    Alcohol use Yes      Comment: 2 beers         Review of Systems:   Constitutional: Negative for fever, chills, weight loss, malaise/fatigue. + occasional anxiety  HEENT: Negative for nosebleeds, vision changes. Respiratory: Negative for cough, hemoptysis  Cardiovascular: Negative for chest pain, palpitations, orthopnea, claudication, leg swelling, syncope, and PND.    Gastrointestinal: Negative for nausea, vomiting, diarrhea, blood in stool and melena. Genitourinary: Negative for dysuria, and hematuria. Musculoskeletal: Negative for myalgias, arthralgia. Skin: Negative for rash. Heme: Does not bleed or bruise easily. Neurological: Negative for speech change and focal weakness     Objective:     Visit Vitals    /81 (BP 1 Location: Right arm, BP Patient Position: At rest)    Pulse 71    Temp 97.4 °F (36.3 °C)    Resp 16    Ht 6' 4\" (1.93 m)    Wt 95.3 kg (210 lb)    SpO2 97%    BMI 25.56 kg/m2      Physical Exam:   Constitutional: well-developed and well-nourished. No respiratory distress. Head: Normocephalic and atraumatic. Eyes: Pupils are equal, round  ENT: hearing normal  Neck: supple. No JVD present. Cardiovascular: Normal rate, regular rhythm. Exam reveals no gallop and no friction rub. No murmur heard. Pulmonary/Chest: Effort normal and breath sounds normal. No wheezes. Nitropaste in place. Abdominal: Soft, no tenderness. Musculoskeletal: no edema. Neurological: alert,oriented. Skin: Skin is warm and dry. Nitropaste patch in place bilateral anterior chest.  (Removed.)  Psychiatric: normal mood and affect. Behavior is normal. Judgment and thought content normal.           Assessment/Plan:   Hypertension urgency  Chronic systolic CHF NYHA class 1-2  Non ischemic cardiomyopathy  Known troponin elevation in the past and now-not NSTEMI. Previous tobacco abuse. Loss of job and insurance, so ran out of meds. Echocardiogram may be done on an outpatient basis. Therapy for heart failure currently includes carvedilol, lisinopril, & amlodipine. No spironolactone. Entresto or Bidil would be ideal, but he cannot afford these. Not currently a candidate for ICD placement. He must be compliant with GDMT x 3 months in order to be considered (assuming LVEF is still <35% at that time). Discussed importance of medication compliance. States he can afford medications now that he has quit smoking. Encouraged him to call for any undesirable side effects so that medications may be changed/titrated if necessary, rather than patient just stopping. Post-discharge, plan to follow in clinic. He agrees to come to clinic to follow up with me and then later in 3 months, reassess with echo. I will get him help with care card when he comes to office for follow up later. Addendum from EP attending:   I have seen, examined patient, and discussed with nurse practitioner, registered nurse, reviewed, updated note and agree with the assessment and plan    I have talked to her this am. He has NYHA class 2  Vital signs are stable  Exam shows regular rhythm and no rub  Lungs clear to auscultation. Legs no edema optimize medication in the office. Assessment and Plan:  Continue to optimize medications in the office. The patient has difficulty with paying for his medication but now he gave up smoking so he should have some money to buy his medication. I strongly recommend him to continue with Coreg, lisinopril and amlodipine. If he cannot get an echo done in the hospital, we will get it done in the office. After 3 months if his left ventricular function cannot be above 35% I would recommend implantable defibrillator his primary prevention of sudden cardiac death. The patient agreed to proceed  Thank you for involving me in this patient's care and please call with further concerns or questions. Naida Harada, M.D.   Electrophysiology/Cardiology  [facility]  [address], [address] [place] [address]  [phone]

## 2018-04-17 NOTE — PROGRESS NOTES
Problem: Falls - Risk of  Goal: *Absence of Falls  Document Nettie Fall Risk and appropriate interventions in the flowsheet.    Outcome: Progressing Towards Goal  Fall Risk Interventions:  Mobility Interventions: Communicate number of staff needed for ambulation/transfer, Patient to call before getting OOB         Medication Interventions: Evaluate medications/consider consulting pharmacy    Elimination Interventions: Call light in reach

## 2018-04-17 NOTE — PROGRESS NOTES
Patient listed as not having a primary care physician. Hospital follow-up PCP transitional care appointment has been scheduled with [facility] for Monday, 4/23/18 (financial screening) at 1:35 p.m followed by PCP at 2:15 p.m. Pending patient discharge.   Alf Cuba, Care Management Specialist.

## 2020-01-31 ENCOUNTER — HOSPITAL ENCOUNTER (EMERGENCY)
Age: 60
Discharge: HOME | End: 2020-01-31
Attending: EMERGENCY MEDICINE | Admitting: EMERGENCY MEDICINE
Payer: COMMERCIAL

## 2020-01-31 ENCOUNTER — APPOINTMENT (OUTPATIENT)
Dept: GENERAL RADIOLOGY | Age: 60
End: 2020-01-31
Attending: NURSE PRACTITIONER
Payer: COMMERCIAL

## 2020-01-31 VITALS
SYSTOLIC BLOOD PRESSURE: 152 MMHG | TEMPERATURE: 97.3 F | DIASTOLIC BLOOD PRESSURE: 102 MMHG | RESPIRATION RATE: 20 BRPM | HEART RATE: 110 BPM | OXYGEN SATURATION: 99 %

## 2020-01-31 DIAGNOSIS — S61.210A LACERATION OF RIGHT INDEX FINGER WITHOUT DAMAGE TO NAIL, FOREIGN BODY PRESENCE UNSPECIFIED, INITIAL ENCOUNTER: Primary | ICD-10-CM

## 2020-01-31 PROCEDURE — 77030018836 HC SOL IRR NACL ICUM -A

## 2020-01-31 PROCEDURE — 90471 IMMUNIZATION ADMIN: CPT

## 2020-01-31 PROCEDURE — 74011250636 HC RX REV CODE- 250/636: Performed by: NURSE PRACTITIONER

## 2020-01-31 PROCEDURE — 73140 X-RAY EXAM OF FINGER(S): CPT

## 2020-01-31 PROCEDURE — 74011000250 HC RX REV CODE- 250: Performed by: NURSE PRACTITIONER

## 2020-01-31 PROCEDURE — 90715 TDAP VACCINE 7 YRS/> IM: CPT | Performed by: NURSE PRACTITIONER

## 2020-01-31 PROCEDURE — 75810000293 HC SIMP/SUPERF WND  RPR

## 2020-01-31 PROCEDURE — 77030002986 HC SUT PROL J&J -A

## 2020-01-31 PROCEDURE — 99283 EMERGENCY DEPT VISIT LOW MDM: CPT

## 2020-01-31 PROCEDURE — 77030008304 HC SPLNT FNGR ALUM DERY -A

## 2020-01-31 RX ORDER — LIDOCAINE HYDROCHLORIDE 10 MG/ML
10 INJECTION INFILTRATION; PERINEURAL
Status: COMPLETED | OUTPATIENT
Start: 2020-01-31 | End: 2020-01-31

## 2020-01-31 RX ORDER — BACITRACIN 500 UNIT/G
1 PACKET (EA) TOPICAL ONCE
Status: COMPLETED | OUTPATIENT
Start: 2020-01-31 | End: 2020-01-31

## 2020-01-31 RX ORDER — AMOXICILLIN AND CLAVULANATE POTASSIUM 875; 125 MG/1; MG/1
1 TABLET, FILM COATED ORAL 2 TIMES DAILY
Qty: 10 TAB | Refills: 0 | Status: SHIPPED | OUTPATIENT
Start: 2020-01-31 | End: 2020-02-05

## 2020-01-31 RX ORDER — IBUPROFEN 600 MG/1
600 TABLET ORAL
Qty: 20 TAB | Refills: 0 | Status: SHIPPED | OUTPATIENT
Start: 2020-01-31

## 2020-01-31 RX ADMIN — TETANUS TOXOID, REDUCED DIPHTHERIA TOXOID AND ACELLULAR PERTUSSIS VACCINE, ADSORBED 0.5 ML: 5; 2.5; 8; 8; 2.5 SUSPENSION INTRAMUSCULAR at 11:26

## 2020-01-31 RX ADMIN — BACITRACIN 1 PACKET: 500 OINTMENT TOPICAL at 11:25

## 2020-01-31 RX ADMIN — LIDOCAINE HYDROCHLORIDE 10 ML: 10 INJECTION, SOLUTION INFILTRATION; PERINEURAL at 11:25

## 2020-01-31 NOTE — ED TRIAGE NOTES
Pt put his hand into his truck in glass; laceration from index finger of R hand. Ambulatory. Unsure of tetanus shot. Ambulatory, a&ox3. States that he hasn't been able to take his \"congestive heart failure meds- I just got my ID back\". States he has drank 1 beer this morning. Denies any other pain. Pt states this happened \"in the wee hours of the morning, like 1 am\".

## 2020-01-31 NOTE — ED PROVIDER NOTES
Initial Complaint: Right hand index finger laceration    Started: 0100 today    Endorses: Cut hand on glass inside of truck. Will not stop bleeding. Admits to drinking a beer this morning  Denies: blood thinner, last known tetanus     Made better: nothing  Made worse: nothing    No further complaints. Past Medical History:  No date: Diabetes ([facility] [address]  No date: Heart failure ([facility] [address]  No date: Hypertension  Past Surgical History:  No date: HX KNEE REPLACEMENT      Comment:  left  Reviewed      Primary care provider: None    The history is provided by the patient. No  was used. Past Medical History:   Diagnosis Date    Diabetes ([facility] [address]     Heart failure ([facility] [address]     Hypertension      Past Surgical History:   Procedure Laterality Date    HX KNEE REPLACEMENT      left     History reviewed. No pertinent family history.     Social History     Socioeconomic History    Marital status: SINGLE     Spouse name: Not on file    Number of children: Not on file    Years of education: Not on file    Highest education level: Not on file   Occupational History    Not on file   Social Needs    Financial resource strain: Not on file    Food insecurity:     Worry: Not on file     Inability: Not on file    Transportation needs:     Medical: Not on file     Non-medical: Not on file   Tobacco Use    Smoking status: Current Every Day Smoker     Packs/day: 1.00    Smokeless tobacco: Never Used   Substance and Sexual Activity    Alcohol use: Yes     Comment: 2 beers     Drug use: No    Sexual activity: Not on file   Lifestyle    Physical activity:     Days per week: Not on file     Minutes per session: Not on file    Stress: Not on file   Relationships    Social connections:     Talks on phone: Not on file     Gets together: Not on file     Attends [employer] service: Not on file     Active member of club or organization: Not on file     Attends meetings of clubs or organizations: Not on file Relationship status: Not on file    Intimate partner violence:     Fear of current or ex partner: Not on file     Emotionally abused: Not on file     Physically abused: Not on file     Forced sexual activity: Not on file   Other Topics Concern    Not on file   Social History Narrative    Not on file     ALLERGIES: Patient has no known allergies. Review of Systems   Skin: Positive for color change and wound. All other systems reviewed and are negative. Vitals:    01/31/20 1048   Pulse: (!) 110   Resp: 20   Temp: 97.3 °F (36.3 °C)   SpO2: 99%          Physical Exam  Vitals signs and nursing note reviewed. Constitutional:       Appearance: He is well-developed. HENT:      Head: Atraumatic. Neck:      Trachea: No tracheal deviation. Cardiovascular:      Rate and Rhythm: Tachycardia present. Pulses:           Radial pulses are 2+ on the right side and 2+ on the left side. Pulmonary:      Effort: Pulmonary effort is normal. No respiratory distress. Musculoskeletal: Normal range of motion. Right hand: He exhibits laceration and swelling. He exhibits normal range of motion. Normal sensation noted. Normal strength noted. Hands:    Skin:     General: Skin is warm and dry. Neurological:      Mental Status: He is alert. Psychiatric:         Behavior: Behavior normal.         Thought Content: Thought content normal.         Judgment: Judgment normal.              [facility]       Wound Repair  Date/Time: 1/31/2020 11:00 AM  Performed by: [address] [place] [address],  [id number] provider: Marco A  Preparation: skin prepped with Shur-Clens  Time out: Immediately prior to the procedure a time out was called to verify the correct patient, procedure, equipment, staff and marking as appropriate. .  Location details: right index finger  Wound length:2.6 - 7.5 cm (~3.2 cm mostly linear wound with split in the line on the distal end)  Anesthesia: digital block    Anesthesia:  Local Anesthetic: lidocaine 1% without epinephrine  Anesthetic total: 8 mL  Irrigation solution: saline  Irrigation method: syringe (500 cc)  Debridement: none  Skin closure: Prolene  Number of sutures: 11  Technique: interrupted  Approximation: close  Dressing: antibiotic ointment and splint (Xeroform)  Patient tolerance: Patient tolerated the procedure well with no immediate complications  My total time at bedside, performing this procedure was 31-45 minutes. Assessment & Plan:     Orders Placed This Encounter    XR 2ND FINGER RT MIN 2 V    diph,Pertuss(AC),Tet Vac-PF (BOOSTRIX) suspension 0.5 mL    lidocaine (XYLOCAINE) 10 mg/mL (1 %) injection 10 mL    bacitracin 500 unit/gram packet 1 Packet       Discussed with Bishop Delphine MD,ED Provider    Nicole Garner NP  01/31/20  10:53 AM    No fracture on film. No foreign body seen. Sutured with 11 interrupted #3 Prolene suture. Will need to be removed in 10 days to 2 weeks. Advised the patient to have a wound check in 3 days. Referred patient to [facility] family practice. Discussed signs and symptoms of wound infection. Augmentin twice daily for 5 days. Discussed return precautions. 12:03 PM  Patient re-evaluated. All questions answered. Patient appropriate for discharge. Given return precautions and follow up instructions. LABORATORY TESTS:  Labs Reviewed - No data to display    IMAGING RESULTS:  XR 2ND FINGER RT MIN 2 V   Final Result   IMPRESSION: Soft tissue swelling without evidence for radiopaque foreign body. MEDICATIONS GIVEN:  Medications   diph,Pertuss(AC),Tet Vac-PF (BOOSTRIX) suspension 0.5 mL (0.5 mL IntraMUSCular Given 1/31/20 1126)   lidocaine (XYLOCAINE) 10 mg/mL (1 %) injection 10 mL (10 mL IntraDERMal Given 1/31/20 1125)   bacitracin 500 unit/gram packet 1 Packet (1 Packet Topical Given 1/31/20 1125)       IMPRESSION:  1.  Laceration of right index finger without damage to nail, foreign body presence unspecified, initial encounter PLAN:  1. Current Discharge Medication List      START taking these medications    Details   amoxicillin-clavulanate (AUGMENTIN) 875-125 mg per tablet Take 1 Tab by mouth two (2) times a day for 5 days. Indications: prevention of infection of right hand wound  Qty: 10 Tab, Refills: 0      ibuprofen (MOTRIN) 600 mg tablet Take 1 Tab by mouth every six (6) hours as needed for Pain. Indications: pain  Qty: 20 Tab, Refills: 0           2. Follow-up Information     Follow up With Specialties Details [address]  In 3 days For wound re-check and in 2 weeks for suture removal [address]    [address] [facility] [address] DEP Emergency Medicine  As needed, If symptoms worsen [facility]  [phone]        3. Return to ED for new or worsening symptoms       Liza Manrique NP      Please note that this dictation was completed with [employer], the computer voice recognition software. Quite often unanticipated grammatical, syntax, homophones, and other interpretive errors are inadvertently transcribed by the computer software. Please disregard these errors. Please excuse any errors that have escaped final proofreading.

## 2020-01-31 NOTE — DISCHARGE INSTRUCTIONS
Thank you for allowing us to care for you today. Please follow-up with your Primary Care provider in the next 2-3 days if your symptoms do not improve. Plan for home:     Lacerations: Your laceration was repaired with 11 interrupted sutures. They should be removed in about 10-14 days. Wash your lacerations with an antibacterial soap like DIAL. Pat dry. Cover with a layer of bacitracin ointment. Cover. Do this twice daily until healed. Please call, return to the ED or see your Primary Care Provider  if there are signs or symptoms of wound infection: fevers, chills, sweating, fast heartbeat, or wounds with increased warmth, increased redness, malodor (bad smelling), purulent (pus) drainage and/or pain that is increased, new or difficult to control. No Swimming, tub baths or hot tubs until your wounds have healed. Ibuprofen 600mg every 6 hours for pain control. Come back to the ER if you have worsening symptoms, fevers over 100.9, shaking chills, nausea or vomiting. Patient Education        Cuts on the Hand Closed With Stitches: Care Instructions  Your Care Instructions    A cut on your hand can be on your fingers, your thumb, or the front or back of your hand. Sometimes a cut can injure the tendons, blood vessels, or nerves of your hand. The doctor used stitches to close the cut. Using stitches also helps the cut heal and reduces scarring. The doctor may have given you a splint to help prevent you from moving your hand, fingers, or thumb. If the cut went deep and through the skin, the doctor put in two layers of stitches. The deeper layer brings the deep part of the cut together. These stitches will dissolve and don't need to be removed. The stitches in the upper layer are the ones you see on the cut. You will probably have a bandage. You will need to have the stitches removed, usually in 7 to 14 days.  The doctor may suggest that you see a hand specialist if the cut is very deep or if you have trouble moving your fingers or have less feeling in your hand. The doctor has checked you carefully, but problems can develop later. If you notice any problems or new symptoms, get medical treatment right away. Follow-up care is a key part of your treatment and safety. Be sure to make and go to all appointments, and call your doctor if you are having problems. It's also a good idea to know your test results and keep a list of the medicines you take. How can you care for yourself at home? · Keep the cut dry for the first 24 to 48 hours. After this, you can shower if your doctor okays it. Pat the cut dry. · Don't soak the cut, such as in a bathtub. Your doctor will tell you when it's safe to get the cut wet. · If your doctor told you how to care for your cut, follow your doctor's instructions. If you did not get instructions, follow this general advice:  ? After the first 24 to 48 hours, wash around the cut with clean water 2 times a day. Don't use hydrogen peroxide or alcohol, which can slow healing. ? You may cover the cut with a thin layer of petroleum jelly, such as Vaseline, and a nonstick bandage. ? Apply more petroleum jelly and replace the bandage as needed. · Prop up the sore hand on a pillow anytime you sit or lie down during the next 3 days. Try to keep it above the level of your heart. This will help reduce swelling. · Avoid any activity that could cause your cut to reopen. · Do not remove the stitches on your own. Your doctor will tell you when to come back to have the stitches removed. · Be safe with medicines. Take pain medicines exactly as directed. ? If the doctor gave you a prescription medicine for pain, take it as prescribed. ? If you are not taking a prescription pain medicine, ask your doctor if you can take an over-the-counter medicine. When should you call for help? Call your doctor now or seek immediate medical care if:    · You have new pain, or your pain gets worse.   · The skin near the cut is cold or pale or changes color.     · You have tingling, weakness, or numbness near the cut.     · The cut starts to bleed, and blood soaks through the bandage. Oozing small amounts of blood is normal.     · You have trouble moving the area of the hand near the cut.     · You have symptoms of infection, such as:  ? Increased pain, swelling, warmth, or redness around the cut.  ? Red streaks leading from the cut.  ? Pus draining from the cut.  ? A fever.    Watch closely for changes in your health, and be sure to contact your doctor if:    · You do not get better as expected. Where can you learn more? Go to http://steve-marsha.info/. Enter T250 in the search box to learn more about \"Cuts on the Hand Closed With Stitches: Care Instructions. \"  Current as of: June 26, 2019  Content Version: 12.2  © 6399-0846 [employer]. Care instructions adapted under license by [employer] (which disclaims liability or warranty for this information). If you have questions about a medical condition or this instruction, always ask your healthcare professional. [address] any warranty or liability for your use of this information.

## 2020-01-31 NOTE — ED NOTES
1214: Finger applied to 2nd finger on the right hand, wrapped in tube gauze and coban. Bacitracin and xeroform applied prior to splint. Patient verbalizes understanding of discharge instructions given by provider, Lynn Cuba NP. Opportunity for questions provided. Patient in no apparent distress. Patient ambulatory upon discharge.    Visit Vitals  BP (!) 152/102   Pulse (!) 110   Temp 97.3 °F (36.3 °C)   Resp 20   SpO2 99%

## 2021-07-09 NOTE — PROGRESS NOTES
5frsheath removed from the right femoral artery,manual pressure and quickclot held for 12 min. femostop in place at 50mmhg.  groing care taken over by PETE Meier 36.6

## 2022-03-18 PROBLEM — I50.22 CHRONIC SYSTOLIC HEART FAILURE (HCC): Status: ACTIVE | Noted: 2018-04-17

## 2022-03-18 PROBLEM — R06.02 SHORTNESS OF BREATH: Status: ACTIVE | Noted: 2018-04-15

## 2022-03-19 PROBLEM — I42.0 NONISCHEMIC DILATED CARDIOMYOPATHY (HCC): Status: ACTIVE | Noted: 2017-07-15

## 2022-03-19 PROBLEM — Z72.0 TOBACCO ABUSE: Status: ACTIVE | Noted: 2017-07-13

## 2022-03-19 PROBLEM — I10 ESSENTIAL HYPERTENSION: Status: ACTIVE | Noted: 2017-07-13

## 2022-03-19 PROBLEM — I42.8 NICM (NONISCHEMIC CARDIOMYOPATHY) (HCC): Status: ACTIVE | Noted: 2017-07-14

## 2022-03-19 PROBLEM — E87.6 HYPOKALEMIA: Status: ACTIVE | Noted: 2018-04-17

## 2022-03-19 PROBLEM — Z98.890 S/P CARDIAC CATH: Status: ACTIVE | Noted: 2017-07-14

## 2022-03-19 PROBLEM — I24.9 ACS (ACUTE CORONARY SYNDROME) (HCC): Status: ACTIVE | Noted: 2017-07-13

## 2022-03-19 PROBLEM — I16.0 HYPERTENSIVE URGENCY: Status: ACTIVE | Noted: 2018-04-15

## 2023-09-10 ENCOUNTER — APPOINTMENT (OUTPATIENT)
Facility: HOSPITAL | Age: 63
DRG: 280 | End: 2023-09-10

## 2023-09-10 ENCOUNTER — APPOINTMENT (OUTPATIENT)
Facility: HOSPITAL | Age: 63
DRG: 280 | End: 2023-09-10
Attending: INTERNAL MEDICINE

## 2023-09-10 ENCOUNTER — HOSPITAL ENCOUNTER (INPATIENT)
Facility: HOSPITAL | Age: 63
LOS: 1 days | Discharge: LEFT BEFORE DISCHARGE | DRG: 280 | End: 2023-09-10
Attending: STUDENT IN AN ORGANIZED HEALTH CARE EDUCATION/TRAINING PROGRAM | Admitting: INTERNAL MEDICINE

## 2023-09-10 VITALS
BODY MASS INDEX: 23.09 KG/M2 | DIASTOLIC BLOOD PRESSURE: 88 MMHG | SYSTOLIC BLOOD PRESSURE: 124 MMHG | HEIGHT: 76 IN | OXYGEN SATURATION: 95 % | RESPIRATION RATE: 23 BRPM | WEIGHT: 189.6 LBS | TEMPERATURE: 97.9 F | HEART RATE: 80 BPM

## 2023-09-10 DIAGNOSIS — I42.8 NONISCHEMIC CARDIOMYOPATHY (HCC): ICD-10-CM

## 2023-09-10 DIAGNOSIS — I50.23 ACUTE ON CHRONIC SYSTOLIC CONGESTIVE HEART FAILURE (HCC): Primary | ICD-10-CM

## 2023-09-10 DIAGNOSIS — R77.8 ELEVATED TROPONIN: ICD-10-CM

## 2023-09-10 DIAGNOSIS — F14.90 COCAINE USE: ICD-10-CM

## 2023-09-10 PROBLEM — R06.00 DYSPNEA: Status: ACTIVE | Noted: 2018-04-15

## 2023-09-10 PROBLEM — I24.9 ACS (ACUTE CORONARY SYNDROME) (HCC): Status: RESOLVED | Noted: 2017-07-13 | Resolved: 2023-09-10

## 2023-09-10 PROBLEM — Z72.0 TOBACCO ABUSE: Status: RESOLVED | Noted: 2017-07-13 | Resolved: 2023-09-10

## 2023-09-10 PROBLEM — R06.02 SHORTNESS OF BREATH: Status: RESOLVED | Noted: 2018-04-15 | Resolved: 2023-09-10

## 2023-09-10 PROBLEM — R79.89 LFT ELEVATION: Status: ACTIVE | Noted: 2023-09-10

## 2023-09-10 PROBLEM — I21.4 NSTEMI (NON-ST ELEVATED MYOCARDIAL INFARCTION) (HCC): Status: ACTIVE | Noted: 2023-09-10

## 2023-09-10 PROBLEM — I25.10 CAD (CORONARY ARTERY DISEASE): Status: ACTIVE | Noted: 2023-09-10

## 2023-09-10 PROBLEM — I50.22 CHF (CONGESTIVE HEART FAILURE), NYHA CLASS III, CHRONIC, SYSTOLIC (HCC): Status: ACTIVE | Noted: 2018-04-17

## 2023-09-10 PROBLEM — Z98.890 S/P CARDIAC CATH: Status: ACTIVE | Noted: 2017-07-14

## 2023-09-10 PROBLEM — E87.6 HYPOKALEMIA: Status: RESOLVED | Noted: 2018-04-17 | Resolved: 2023-09-10

## 2023-09-10 PROBLEM — E11.59 DM TYPE 2 CAUSING VASCULAR DISEASE (HCC): Status: ACTIVE | Noted: 2023-09-10

## 2023-09-10 PROBLEM — J81.1 PULMONARY EDEMA: Status: ACTIVE | Noted: 2023-09-10

## 2023-09-10 PROBLEM — I16.0 HYPERTENSIVE URGENCY: Status: ACTIVE | Noted: 2018-04-15

## 2023-09-10 PROBLEM — I10 HYPERTENSION: Status: ACTIVE | Noted: 2017-07-13

## 2023-09-10 PROBLEM — I42.0 NONISCHEMIC DILATED CARDIOMYOPATHY (HCC): Status: ACTIVE | Noted: 2017-07-15

## 2023-09-10 PROBLEM — I50.9 HEART FAILURE (HCC): Status: ACTIVE | Noted: 2023-09-10

## 2023-09-10 PROBLEM — E11.9 DIABETES (HCC): Status: ACTIVE | Noted: 2023-09-10

## 2023-09-10 PROBLEM — F14.10 COCAINE ABUSE (HCC): Status: ACTIVE | Noted: 2023-09-10

## 2023-09-10 LAB
-: ABNORMAL
ALBUMIN SERPL-MCNC: 3.2 G/DL (ref 3.5–5)
ALBUMIN/GLOB SERPL: 0.6 (ref 1.1–2.2)
ALP SERPL-CCNC: 113 U/L (ref 45–117)
ALT SERPL-CCNC: 103 U/L (ref 12–78)
AMPHET UR QL SCN: NEGATIVE
ANION GAP SERPL CALC-SCNC: 5 MMOL/L (ref 5–15)
APTT PPP: 30.2 SEC (ref 22.1–31)
AST SERPL-CCNC: 122 U/L (ref 15–37)
B PERT DNA SPEC QL NAA+PROBE: NOT DETECTED
BARBITURATES UR QL SCN: NEGATIVE
BASOPHILS # BLD: 0.1 K/UL (ref 0–0.1)
BASOPHILS NFR BLD: 1 % (ref 0–1)
BENZODIAZ UR QL: NEGATIVE
BILIRUB SERPL-MCNC: 1.2 MG/DL (ref 0.2–1)
BORDETELLA PARAPERTUSSIS BY PCR: NOT DETECTED
BUN SERPL-MCNC: 11 MG/DL (ref 6–20)
BUN/CREAT SERPL: 9 (ref 12–20)
C PNEUM DNA SPEC QL NAA+PROBE: NOT DETECTED
CALCIUM SERPL-MCNC: 8.9 MG/DL (ref 8.5–10.1)
CANNABINOIDS UR QL SCN: NEGATIVE
CHLORIDE SERPL-SCNC: 104 MMOL/L (ref 97–108)
CHOLEST SERPL-MCNC: 144 MG/DL
CO2 SERPL-SCNC: 25 MMOL/L (ref 21–32)
COCAINE UR QL SCN: POSITIVE
COMMENT:: NORMAL
CREAT SERPL-MCNC: 1.17 MG/DL (ref 0.7–1.3)
DIFFERENTIAL METHOD BLD: NORMAL
EOSINOPHIL # BLD: 0.2 K/UL (ref 0–0.4)
EOSINOPHIL NFR BLD: 3 % (ref 0–7)
ERYTHROCYTE [DISTWIDTH] IN BLOOD BY AUTOMATED COUNT: 11.3 % (ref 11.5–14.5)
ERYTHROCYTE [DISTWIDTH] IN BLOOD BY AUTOMATED COUNT: 11.5 % (ref 11.5–14.5)
EST. AVERAGE GLUCOSE BLD GHB EST-MCNC: 100 MG/DL
ETHANOL SERPL-MCNC: <10 MG/DL (ref 0–0.08)
ETHANOL SERPL-MCNC: <10 MG/DL (ref 0–0.08)
FLUAV SUBTYP SPEC NAA+PROBE: NOT DETECTED
FLUBV RNA SPEC QL NAA+PROBE: NOT DETECTED
GLOBULIN SER CALC-MCNC: 5.6 G/DL (ref 2–4)
GLUCOSE BLD STRIP.AUTO-MCNC: 113 MG/DL (ref 65–117)
GLUCOSE SERPL-MCNC: 123 MG/DL (ref 65–100)
HADV DNA SPEC QL NAA+PROBE: NOT DETECTED
HAV IGM SER QL: NONREACTIVE
HBA1C MFR BLD: 5.1 % (ref 4–5.6)
HBV CORE IGM SER QL: NONREACTIVE
HBV SURFACE AG SER QL: <0.1 INDEX
HBV SURFACE AG SER QL: NEGATIVE
HCOV 229E RNA SPEC QL NAA+PROBE: NOT DETECTED
HCOV HKU1 RNA SPEC QL NAA+PROBE: NOT DETECTED
HCOV NL63 RNA SPEC QL NAA+PROBE: NOT DETECTED
HCOV OC43 RNA SPEC QL NAA+PROBE: NOT DETECTED
HCT VFR BLD AUTO: 41.7 % (ref 36.6–50.3)
HCT VFR BLD AUTO: 43.9 % (ref 36.6–50.3)
HCV AB SER IA-ACNC: >11 INDEX
HCV AB SERPL QL IA: REACTIVE
HDLC SERPL-MCNC: 66 MG/DL
HDLC SERPL: 2.2 (ref 0–5)
HGB BLD-MCNC: 14.5 G/DL (ref 12.1–17)
HGB BLD-MCNC: 15.1 G/DL (ref 12.1–17)
HMPV RNA SPEC QL NAA+PROBE: NOT DETECTED
HPIV1 RNA SPEC QL NAA+PROBE: NOT DETECTED
HPIV2 RNA SPEC QL NAA+PROBE: NOT DETECTED
HPIV3 RNA SPEC QL NAA+PROBE: NOT DETECTED
HPIV4 RNA SPEC QL NAA+PROBE: NOT DETECTED
IMM GRANULOCYTES # BLD AUTO: 0 K/UL (ref 0–0.04)
IMM GRANULOCYTES NFR BLD AUTO: 0 % (ref 0–0.5)
INR PPP: 1.1 (ref 0.9–1.1)
LDLC SERPL CALC-MCNC: 66.4 MG/DL (ref 0–100)
LYMPHOCYTES # BLD: 2.1 K/UL (ref 0.8–3.5)
LYMPHOCYTES NFR BLD: 31 % (ref 12–49)
Lab: ABNORMAL
M PNEUMO DNA SPEC QL NAA+PROBE: NOT DETECTED
MAGNESIUM SERPL-MCNC: 1.6 MG/DL (ref 1.6–2.4)
MCH RBC QN AUTO: 32.6 PG (ref 26–34)
MCH RBC QN AUTO: 32.7 PG (ref 26–34)
MCHC RBC AUTO-ENTMCNC: 34.4 G/DL (ref 30–36.5)
MCHC RBC AUTO-ENTMCNC: 34.8 G/DL (ref 30–36.5)
MCV RBC AUTO: 94.1 FL (ref 80–99)
MCV RBC AUTO: 94.8 FL (ref 80–99)
METHADONE UR QL: NEGATIVE
MONOCYTES # BLD: 0.4 K/UL (ref 0–1)
MONOCYTES NFR BLD: 5 % (ref 5–13)
NEUTS SEG # BLD: 4 K/UL (ref 1.8–8)
NEUTS SEG NFR BLD: 60 % (ref 32–75)
NRBC # BLD: 0 K/UL (ref 0–0.01)
NRBC # BLD: 0 K/UL (ref 0–0.01)
NRBC BLD-RTO: 0 PER 100 WBC
NRBC BLD-RTO: 0 PER 100 WBC
NT PRO BNP: 8639 PG/ML
OPIATES UR QL: NEGATIVE
PCP UR QL: NEGATIVE
PLATELET # BLD AUTO: 188 K/UL (ref 150–400)
PLATELET # BLD AUTO: 197 K/UL (ref 150–400)
PMV BLD AUTO: 10.1 FL (ref 8.9–12.9)
PMV BLD AUTO: 10.2 FL (ref 8.9–12.9)
POTASSIUM SERPL-SCNC: 3.7 MMOL/L (ref 3.5–5.1)
PROCALCITONIN SERPL-MCNC: 0.98 NG/ML
PROT SERPL-MCNC: 8.8 G/DL (ref 6.4–8.2)
PROTHROMBIN TIME: 11 SEC (ref 9–11.1)
RBC # BLD AUTO: 4.43 M/UL (ref 4.1–5.7)
RBC # BLD AUTO: 4.63 M/UL (ref 4.1–5.7)
RSV RNA SPEC QL NAA+PROBE: NOT DETECTED
RV+EV RNA SPEC QL NAA+PROBE: NOT DETECTED
SARS-COV-2 RNA RESP QL NAA+PROBE: NOT DETECTED
SERVICE CMNT-IMP: NORMAL
SODIUM SERPL-SCNC: 134 MMOL/L (ref 136–145)
SPECIMEN HOLD: NORMAL
SPECIMEN HOLD: NORMAL
THERAPEUTIC RANGE: NORMAL SECS (ref 58–77)
TRIGL SERPL-MCNC: 58 MG/DL
TROPONIN I SERPL HS-MCNC: 1262 NG/L (ref 0–76)
TROPONIN I SERPL HS-MCNC: 1520 NG/L (ref 0–76)
UFH PPP CHRO-ACNC: <0.1 IU/ML
VLDLC SERPL CALC-MCNC: 11.6 MG/DL
WBC # BLD AUTO: 6.8 K/UL (ref 4.1–11.1)
WBC # BLD AUTO: 7.5 K/UL (ref 4.1–11.1)

## 2023-09-10 PROCEDURE — 83880 ASSAY OF NATRIURETIC PEPTIDE: CPT

## 2023-09-10 PROCEDURE — C8929 TTE W OR WO FOL WCON,DOPPLER: HCPCS

## 2023-09-10 PROCEDURE — 99285 EMERGENCY DEPT VISIT HI MDM: CPT

## 2023-09-10 PROCEDURE — 6360000002 HC RX W HCPCS: Performed by: INTERNAL MEDICINE

## 2023-09-10 PROCEDURE — 85730 THROMBOPLASTIN TIME PARTIAL: CPT

## 2023-09-10 PROCEDURE — 85520 HEPARIN ASSAY: CPT

## 2023-09-10 PROCEDURE — 36415 COLL VENOUS BLD VENIPUNCTURE: CPT

## 2023-09-10 PROCEDURE — 93005 ELECTROCARDIOGRAM TRACING: CPT | Performed by: STUDENT IN AN ORGANIZED HEALTH CARE EDUCATION/TRAINING PROGRAM

## 2023-09-10 PROCEDURE — 6360000004 HC RX CONTRAST MEDICATION: Performed by: INTERNAL MEDICINE

## 2023-09-10 PROCEDURE — 80061 LIPID PANEL: CPT

## 2023-09-10 PROCEDURE — 94762 N-INVAS EAR/PLS OXIMTRY CONT: CPT

## 2023-09-10 PROCEDURE — 83735 ASSAY OF MAGNESIUM: CPT

## 2023-09-10 PROCEDURE — 6370000000 HC RX 637 (ALT 250 FOR IP): Performed by: STUDENT IN AN ORGANIZED HEALTH CARE EDUCATION/TRAINING PROGRAM

## 2023-09-10 PROCEDURE — 85610 PROTHROMBIN TIME: CPT

## 2023-09-10 PROCEDURE — 85025 COMPLETE CBC W/AUTO DIFF WBC: CPT

## 2023-09-10 PROCEDURE — 71045 X-RAY EXAM CHEST 1 VIEW: CPT

## 2023-09-10 PROCEDURE — 83036 HEMOGLOBIN GLYCOSYLATED A1C: CPT

## 2023-09-10 PROCEDURE — 80074 ACUTE HEPATITIS PANEL: CPT

## 2023-09-10 PROCEDURE — 85027 COMPLETE CBC AUTOMATED: CPT

## 2023-09-10 PROCEDURE — 96374 THER/PROPH/DIAG INJ IV PUSH: CPT

## 2023-09-10 PROCEDURE — 84145 PROCALCITONIN (PCT): CPT

## 2023-09-10 PROCEDURE — 0202U NFCT DS 22 TRGT SARS-COV-2: CPT

## 2023-09-10 PROCEDURE — 6370000000 HC RX 637 (ALT 250 FOR IP): Performed by: INTERNAL MEDICINE

## 2023-09-10 PROCEDURE — 80053 COMPREHEN METABOLIC PANEL: CPT

## 2023-09-10 PROCEDURE — A4216 STERILE WATER/SALINE, 10 ML: HCPCS | Performed by: INTERNAL MEDICINE

## 2023-09-10 PROCEDURE — 84484 ASSAY OF TROPONIN QUANT: CPT

## 2023-09-10 PROCEDURE — 82077 ASSAY SPEC XCP UR&BREATH IA: CPT

## 2023-09-10 PROCEDURE — 2580000003 HC RX 258: Performed by: INTERNAL MEDICINE

## 2023-09-10 PROCEDURE — 80307 DRUG TEST PRSMV CHEM ANLYZR: CPT

## 2023-09-10 PROCEDURE — 6360000002 HC RX W HCPCS: Performed by: STUDENT IN AN ORGANIZED HEALTH CARE EDUCATION/TRAINING PROGRAM

## 2023-09-10 PROCEDURE — 82962 GLUCOSE BLOOD TEST: CPT

## 2023-09-10 PROCEDURE — 1100000000 HC RM PRIVATE

## 2023-09-10 RX ORDER — VALSARTAN 40 MG/1
80 TABLET ORAL DAILY
Status: DISCONTINUED | OUTPATIENT
Start: 2023-09-10 | End: 2023-09-10 | Stop reason: HOSPADM

## 2023-09-10 RX ORDER — FUROSEMIDE 10 MG/ML
40 INJECTION INTRAMUSCULAR; INTRAVENOUS ONCE
Status: DISCONTINUED | OUTPATIENT
Start: 2023-09-10 | End: 2023-09-10 | Stop reason: HOSPADM

## 2023-09-10 RX ORDER — ONDANSETRON 2 MG/ML
4 INJECTION INTRAMUSCULAR; INTRAVENOUS EVERY 6 HOURS PRN
Status: DISCONTINUED | OUTPATIENT
Start: 2023-09-10 | End: 2023-09-10 | Stop reason: HOSPADM

## 2023-09-10 RX ORDER — FUROSEMIDE 10 MG/ML
40 INJECTION INTRAMUSCULAR; INTRAVENOUS ONCE
Status: COMPLETED | OUTPATIENT
Start: 2023-09-10 | End: 2023-09-10

## 2023-09-10 RX ORDER — SPIRONOLACTONE 25 MG/1
25 TABLET ORAL DAILY
Status: DISCONTINUED | OUTPATIENT
Start: 2023-09-10 | End: 2023-09-10 | Stop reason: HOSPADM

## 2023-09-10 RX ORDER — SODIUM CHLORIDE 9 MG/ML
INJECTION, SOLUTION INTRAVENOUS PRN
Status: DISCONTINUED | OUTPATIENT
Start: 2023-09-10 | End: 2023-09-10 | Stop reason: HOSPADM

## 2023-09-10 RX ORDER — HEPARIN SODIUM 1000 [USP'U]/ML
4000 INJECTION, SOLUTION INTRAVENOUS; SUBCUTANEOUS PRN
Status: DISCONTINUED | OUTPATIENT
Start: 2023-09-10 | End: 2023-09-10 | Stop reason: HOSPADM

## 2023-09-10 RX ORDER — LISINOPRIL 10 MG/1
5 TABLET ORAL DAILY
Status: DISCONTINUED | OUTPATIENT
Start: 2023-09-10 | End: 2023-09-10

## 2023-09-10 RX ORDER — ENOXAPARIN SODIUM 100 MG/ML
40 INJECTION SUBCUTANEOUS DAILY
Status: DISCONTINUED | OUTPATIENT
Start: 2023-09-10 | End: 2023-09-10

## 2023-09-10 RX ORDER — ACETAMINOPHEN 650 MG/1
650 SUPPOSITORY RECTAL EVERY 6 HOURS PRN
Status: DISCONTINUED | OUTPATIENT
Start: 2023-09-10 | End: 2023-09-10 | Stop reason: HOSPADM

## 2023-09-10 RX ORDER — ONDANSETRON 4 MG/1
4 TABLET, ORALLY DISINTEGRATING ORAL EVERY 8 HOURS PRN
Status: DISCONTINUED | OUTPATIENT
Start: 2023-09-10 | End: 2023-09-10 | Stop reason: HOSPADM

## 2023-09-10 RX ORDER — ACETAMINOPHEN 325 MG/1
650 TABLET ORAL EVERY 6 HOURS PRN
Status: DISCONTINUED | OUTPATIENT
Start: 2023-09-10 | End: 2023-09-10 | Stop reason: HOSPADM

## 2023-09-10 RX ORDER — HEPARIN SODIUM 10000 [USP'U]/100ML
5-30 INJECTION, SOLUTION INTRAVENOUS CONTINUOUS
Status: DISCONTINUED | OUTPATIENT
Start: 2023-09-10 | End: 2023-09-10 | Stop reason: SDUPTHER

## 2023-09-10 RX ORDER — HEPARIN SODIUM 10000 [USP'U]/100ML
5-30 INJECTION, SOLUTION INTRAVENOUS CONTINUOUS
Status: DISCONTINUED | OUTPATIENT
Start: 2023-09-10 | End: 2023-09-10 | Stop reason: HOSPADM

## 2023-09-10 RX ORDER — DEXTROSE MONOHYDRATE 100 MG/ML
INJECTION, SOLUTION INTRAVENOUS CONTINUOUS PRN
Status: DISCONTINUED | OUTPATIENT
Start: 2023-09-10 | End: 2023-09-10 | Stop reason: HOSPADM

## 2023-09-10 RX ORDER — SODIUM CHLORIDE 0.9 % (FLUSH) 0.9 %
5-40 SYRINGE (ML) INJECTION PRN
Status: DISCONTINUED | OUTPATIENT
Start: 2023-09-10 | End: 2023-09-10 | Stop reason: HOSPADM

## 2023-09-10 RX ORDER — POLYETHYLENE GLYCOL 3350 17 G/17G
17 POWDER, FOR SOLUTION ORAL DAILY PRN
Status: DISCONTINUED | OUTPATIENT
Start: 2023-09-10 | End: 2023-09-10 | Stop reason: HOSPADM

## 2023-09-10 RX ORDER — AMLODIPINE BESYLATE 5 MG/1
10 TABLET ORAL
Status: COMPLETED | OUTPATIENT
Start: 2023-09-10 | End: 2023-09-10

## 2023-09-10 RX ORDER — INSULIN LISPRO 100 [IU]/ML
0-4 INJECTION, SOLUTION INTRAVENOUS; SUBCUTANEOUS NIGHTLY
Status: DISCONTINUED | OUTPATIENT
Start: 2023-09-10 | End: 2023-09-10 | Stop reason: HOSPADM

## 2023-09-10 RX ORDER — FUROSEMIDE 10 MG/ML
40 INJECTION INTRAMUSCULAR; INTRAVENOUS 2 TIMES DAILY
Status: DISCONTINUED | OUTPATIENT
Start: 2023-09-10 | End: 2023-09-10 | Stop reason: HOSPADM

## 2023-09-10 RX ORDER — SODIUM CHLORIDE 0.9 % (FLUSH) 0.9 %
5-40 SYRINGE (ML) INJECTION EVERY 12 HOURS SCHEDULED
Status: DISCONTINUED | OUTPATIENT
Start: 2023-09-10 | End: 2023-09-10 | Stop reason: HOSPADM

## 2023-09-10 RX ORDER — HEPARIN SODIUM 1000 [USP'U]/ML
2000 INJECTION, SOLUTION INTRAVENOUS; SUBCUTANEOUS PRN
Status: DISCONTINUED | OUTPATIENT
Start: 2023-09-10 | End: 2023-09-10 | Stop reason: HOSPADM

## 2023-09-10 RX ORDER — CARVEDILOL 3.12 MG/1
6.25 TABLET ORAL 2 TIMES DAILY WITH MEALS
Status: DISCONTINUED | OUTPATIENT
Start: 2023-09-10 | End: 2023-09-10

## 2023-09-10 RX ORDER — ASPIRIN 81 MG/1
324 TABLET, CHEWABLE ORAL
Status: COMPLETED | OUTPATIENT
Start: 2023-09-10 | End: 2023-09-10

## 2023-09-10 RX ORDER — CLONIDINE HYDROCHLORIDE 0.1 MG/1
0.1 TABLET ORAL 2 TIMES DAILY
Status: DISCONTINUED | OUTPATIENT
Start: 2023-09-10 | End: 2023-09-10 | Stop reason: HOSPADM

## 2023-09-10 RX ORDER — INSULIN LISPRO 100 [IU]/ML
0-8 INJECTION, SOLUTION INTRAVENOUS; SUBCUTANEOUS
Status: DISCONTINUED | OUTPATIENT
Start: 2023-09-10 | End: 2023-09-10 | Stop reason: HOSPADM

## 2023-09-10 RX ADMIN — SPIRONOLACTONE 25 MG: 25 TABLET ORAL at 12:56

## 2023-09-10 RX ADMIN — HEPARIN SODIUM 11 UNITS/KG/HR: 10000 INJECTION, SOLUTION INTRAVENOUS at 13:07

## 2023-09-10 RX ADMIN — AMLODIPINE BESYLATE 10 MG: 5 TABLET ORAL at 07:59

## 2023-09-10 RX ADMIN — FUROSEMIDE 40 MG: 10 INJECTION, SOLUTION INTRAMUSCULAR; INTRAVENOUS at 07:58

## 2023-09-10 RX ADMIN — ASPIRIN 81 MG CHEWABLE TABLET 324 MG: 81 TABLET CHEWABLE at 09:05

## 2023-09-10 RX ADMIN — EMPAGLIFLOZIN 10 MG: 10 TABLET, FILM COATED ORAL at 12:56

## 2023-09-10 RX ADMIN — CLONIDINE HYDROCHLORIDE 0.1 MG: 0.1 TABLET ORAL at 10:52

## 2023-09-10 RX ADMIN — VALSARTAN 80 MG: 40 TABLET, FILM COATED ORAL at 12:56

## 2023-09-10 RX ADMIN — PERFLUTREN 1 ML: 6.52 INJECTION, SUSPENSION INTRAVENOUS at 16:58

## 2023-09-10 ASSESSMENT — ENCOUNTER SYMPTOMS
BACK PAIN: 0
SHORTNESS OF BREATH: 1
VOMITING: 0
ABDOMINAL PAIN: 0

## 2023-09-10 NOTE — H&P
I have reviewed previous records       Assessment and Plan:      Acute pulmonary edema / Hypertensive urgency / Dyspnea - POA due to fluid overload and use of cocaine. Diurese with IV lasix and spironolactone. Add clonidine and valsartan. ER gave Norvasc. CHF (congestive heart failure), NYHA class III, acute on chronic, systolic / dilated cardiomyopathy - POA, acute due to use of cocaine and non compliance with meds. Check ECHO. Cardiology consulted. Added diuretics, ARB And jardiance. Hold BB in setting of cocaine use. Elevated troponin / CAD (coronary artery disease) S/P prior cardiac cath / Hypertension - POA, I doubt that troponin reflects NSTEMI (non-ST elevated myocardial infarction). EKG non acute. Much more likely troponin reflects strain, cocaine, etc.  Trend troponin. Check ECHO. Cardiology consulted. They started heparin drip, I would stop it if troponin improving and ECHO non acute. Continue ASA, statin. DM type 2 causing vascular disease - Diabetic diet and counseling. SSI per protocol. Continue Jardiance. Check A1c. Cocaine abuse - Advise cessation. LFT elevation - POA, presumed due to CHF venous pressure. Check hepatitis serologies. Monitor. Telemetry reviewed:   normal sinus rhythm    Risk of deterioration: high      Total time spent with patient: 48 Minutes I personally reviewed chart, notes, data and current medications in the medical record. I have personally examined and treated the patient at bedside during this period.                   Care Plan discussed with: Patient, Care Manager, Nursing Staff, Consultant/Specialist, and >50% of time spent in counseling and coordination of care    Discussed:  Care Plan and D/C Planning       ___________________________________________________    Attending Physician: Yesi Brennan MD

## 2023-09-10 NOTE — CONSULTS
CARDIOLOGY CONSULT                  Subjective:    Date of  Admission:   Admission type:Emergency    William Henao, APRN - NP       This is a 63 yom with CHF, HTN, DM, cardiomyopathy here with acute on chronic systolic CHF, [id number]. He has a long history of medical non-adherence. He is followed at [facility] by cardiology there. He was last admitted in January for a similar presentation. He has not taken medications consistently as an OP for some time. He has not had an ischemic evaluation previously as there was concern that he would not be adherent with DAPT in the event that he had a PCI. He has been in his usual state of health but has been depressed due to a number of recent issues. He started to have MARTINEZ and then developed orthopnea and PND overnight and came to the ED. ED workup showed a significantly elevated troponin and BNP.  He received IV diuresis and feels back to normal.    Patient Active Problem List   Diagnosis    CHF (congestive heart failure), NYHA class III, chronic, systolic (HCC)    Nonischemic dilated cardiomyopathy (HCC)    S/P cardiac cath    Hypertension    Hypertensive urgency    NSTEMI (non-ST elevated myocardial infarction) ([address])    Heart failure ([address])    DM type 2 causing vascular disease ([address])    Cocaine abuse ([address])    LFT elevation    CAD (coronary artery disease)        Past Medical History:   Diagnosis Date    CAD (coronary artery disease)     CHF (congestive heart failure), NYHA class III, chronic, systolic (HCC)     DM type 2 causing vascular disease ([address])     Hypertension       Past Surgical History:   Procedure Laterality Date    TOTAL KNEE ARTHROPLASTY      left      [facility]   Social History     Socioeconomic History    Marital status: Single     Spouse name: Not on file    Number of children: Not on file    Years of education: Not on file    Highest education level: Not on file   Occupational History    Not on file   Tobacco Use    Smoking status: Every Day

## 2023-09-10 NOTE — ED NOTES
Pt states that he does not want to spend the night in the hospital. MD made aware, states he will call the patient to discuss risks of leaving against medical advise.       Nae Silva RN  09/10/23 [id number]

## 2023-09-10 NOTE — DISCHARGE SUMMARY
Physician Discharge Summary     Patient ID:  Christine Sanz  [id number]  82 y.o.  1960    Admit date: 9/10/2023    Discharge date of service and time: 9/10/2023  Greater than 30 minutes were spent providing discharge related services for this patient    Admission Diagnoses: NSTEMI (non-ST elevated myocardial infarction) [facility]) [I21.4]    Discharge Diagnoses:    Principal Diagnosis   NSTEMI (non-ST elevated myocardial infarction) [facility])                                             Hospital Course and other diagnoses  Acute pulmonary edema / Hypertensive urgency / Dyspnea - POA due to fluid overload and use of cocaine. Diurese with IV lasix and spironolactone. Add clonidine and valsartan. ER gave Norvasc. The same afternoon as admission, the patient asked to leave AMA. I discussed his risk of death from untreated NSTEMI. I advised him to return to ER is symptoms occur, to see PCP ASAP and to avoid cocaine. He acknowledged risks and left AMA     CHF (congestive heart failure), NYHA class III, acute on chronic, systolic / dilated cardiomyopathy - POA, acute due to use of cocaine and non compliance with meds. Check ECHO. Cardiology consulted. Added diuretics, ARB And jardiance. Hold BB in setting of cocaine use. Elevated troponin / CAD (coronary artery disease) S/P prior cardiac cath / Hypertension - POA, I doubt that troponin reflects NSTEMI (non-ST elevated myocardial infarction). EKG non acute. Much more likely troponin reflects strain, cocaine, etc.  Trend troponin. Check ECHO. Cardiology consulted. They started heparin drip, I would stop it if troponin improving and ECHO non acute. Continue ASA, statin. DM type 2 causing vascular disease - Diabetic diet and counseling. SSI per protocol. Continue Jardiance. Check A1c. Cocaine abuse - Advise cessation. LFT elevation - POA, presumed due to CHF venous pressure. Check hepatitis serologies. Monitor.     PCP: INGRID Sadler -

## 2023-09-10 NOTE — ED PROVIDER NOTES
Procedures      DIFFERENTIAL DIAGNOSIS/MDM:   Medical Decision Making  The patient is a 63-year-old male presenting today with progressively worsening shortness of breath and orthopnea/dyspnea on exertion/PND since last night. On exam he has rales however does not appear grossly hypervolemic. Vitals show moderately elevated blood pressure but otherwise stable without hypoxia. Labs with UDS positive cocaine, no leukocytosis or anemia, mild transaminitis, electrolytes and renal function okay, BNP elevated at 8639 with elevated troponin at 1520. Patient given aspirin. Home dose of amlodipine given as well as Lasix 40 mg IV. Chest x-ray showing pulmonary vascular congestion. Suspect exacerbation of chronic systolic heart failure in the setting of cocaine use and nonischemic cardiomyopathy. Plan to admit for further management. Problems Addressed:  Acute on chronic systolic congestive heart failure [facility]): chronic illness or injury with exacerbation, progression, or side effects of treatment  Cocaine use: chronic illness or injury  Elevated troponin: acute illness or injury  Nonischemic cardiomyopathy ([address]): chronic illness or injury    Amount and/or Complexity of Data Reviewed  Labs: ordered. Decision-making details documented in ED Course. Radiology: ordered. Decision-making details documented in ED Course. ECG/medicine tests: ordered. Decision-making details documented in ED Course. Risk  OTC drugs. Prescription drug management. Decision regarding hospitalization. FINAL IMPRESSION      1. Acute on chronic systolic congestive heart failure ([address])    2. Nonischemic cardiomyopathy ([address])    3. Cocaine use    4. Elevated troponin          DISPOSITION/PLAN   DISPOSITION Decision To Admit 09/10/2023 08:20:48 AM    Perfect Serve Consult for Admission  9:43 AM    ED Room Number: ER07/07  Patient Name and age:  Stacia Del Valle 63 y.o.  male  Working Diagnosis:   1.  Acute on chronic systolic

## 2023-09-11 LAB
ECHO AO ASC DIAM: 3.8 CM
ECHO AO ASCENDING AORTA INDEX: 1.75 CM/M2
ECHO AO ROOT DIAM: 4.1 CM
ECHO AO ROOT INDEX: 1.89 CM/M2
ECHO AR MAX VEL PISA: 3.5 M/S
ECHO AV PEAK GRADIENT: 3 MMHG
ECHO AV PEAK VELOCITY: 0.9 M/S
ECHO AV REGURGITANT PHT: 347.7 MILLISECOND
ECHO BSA: 2.15 M2
ECHO EST RA PRESSURE: 8 MMHG
ECHO LA VOL 2C: 156 ML (ref 18–58)
ECHO LA VOL 2C: 164 ML (ref 18–58)
ECHO LA VOL 4C: 90 ML (ref 18–58)
ECHO LA VOL 4C: 94 ML (ref 18–58)
ECHO LA VOLUME AREA LENGTH: 125 ML
ECHO LA VOLUME INDEX AREA LENGTH: 58 ML/M2 (ref 16–34)
ECHO LV E' LATERAL VELOCITY: 4 CM/S
ECHO LV E' SEPTAL VELOCITY: 5 CM/S
ECHO LV EJECTION FRACTION A2C: 22 %
ECHO LV EJECTION FRACTION A4C: 27 %
ECHO LV FRACTIONAL SHORTENING: 8 % (ref 28–44)
ECHO LV INTERNAL DIMENSION DIASTOLE INDEX: 2.9 CM/M2
ECHO LV INTERNAL DIMENSION DIASTOLIC: 6.3 CM (ref 4.2–5.9)
ECHO LV INTERNAL DIMENSION SYSTOLIC INDEX: 2.67 CM/M2
ECHO LV INTERNAL DIMENSION SYSTOLIC: 5.8 CM
ECHO LV IVSD: 1.3 CM (ref 0.6–1)
ECHO LV MASS 2D: 483.6 G (ref 88–224)
ECHO LV MASS INDEX 2D: 222.9 G/M2 (ref 49–115)
ECHO LV POSTERIOR WALL DIASTOLIC: 1.8 CM (ref 0.6–1)
ECHO LV RELATIVE WALL THICKNESS RATIO: 0.57
ECHO LVOT AREA: 6.2 CM2
ECHO LVOT DIAM: 2.8 CM
ECHO MV A VELOCITY: 0.61 M/S
ECHO MV AREA PHT: 7 CM2
ECHO MV E DECELERATION TIME (DT): 108 MS
ECHO MV E VELOCITY: 0.8 M/S
ECHO MV E/A RATIO: 1.31
ECHO MV E/E' LATERAL: 20
ECHO MV E/E' RATIO (AVERAGED): 18
ECHO MV E/E' SEPTAL: 16
ECHO MV PRESSURE HALF TIME (PHT): 31.3 MS
ECHO MV REGURGITANT ALIASING (NYQUIST) VELOCITY: 34 CM/S
ECHO MV REGURGITANT VELOCITY PISA: 5.2 M/S
ECHO MV REGURGITANT VTIA: 163.1 CM
ECHO PULMONARY ARTERY END DIASTOLIC PRESSURE: 7 MMHG
ECHO PV ACCELERATION TIME (AT): 97.1 MS
ECHO PV REGURGITANT MAX VELOCITY: 1.3 M/S
ECHO RV TAPSE: 2.5 CM (ref 1.7–?)

## 2023-09-11 NOTE — CARDIO/PULMONARY
Cardiac Rehab: Review of chart done for Prasanth Sullivan based on elevated troponin. The following note is from ED physician:    Elevated troponin / CAD (coronary artery disease) S/P prior cardiac cath / Hypertension - POA, I doubt that troponin reflects NSTEMI (non-ST elevated myocardial infarction). EKG non acute. Much more likely troponin reflects strain, cocaine, etc.  Trend troponin. Check ECHO. Cardiology consulted. They started heparin drip, I would stop it if troponin improving and ECHO non acute. Continue ASA, statin. Due to social issues Prasanth Sullivan is not a good candidate for OP CR. Cortez Bella RN

## 2023-09-12 LAB
EKG ATRIAL RATE: 89 BPM
EKG DIAGNOSIS: NORMAL
EKG P AXIS: 47 DEGREES
EKG P-R INTERVAL: 184 MS
EKG Q-T INTERVAL: 410 MS
EKG QRS DURATION: 102 MS
EKG QTC CALCULATION (BAZETT): 498 MS
EKG R AXIS: 1 DEGREES
EKG T AXIS: 155 DEGREES
EKG VENTRICULAR RATE: 89 BPM

## 2023-12-28 NOTE — PROCEDURES
[address]  *** FINAL REPORT ***    Name: Nneka Downey  MRN: [id number]    Inpatient  : 13 Sep 1960  HIS Order #: [id number]  [id number] [facility] Visit #: [id number]  Date: 2018    TYPE OF TEST: Peripheral Arterial Testing    REASON FOR TEST  Lower extremity pain. Right Leg  Segmentals: Normal                     mmHg  Brachial         100  High thigh  Low thigh  Calf  Posterior tibial 128  Dorsalis pedis   132  Peroneal  Metatarsal  Toe pressure  Doppler:  PVR:  Ankle/Brachial: 1.20    Left Leg  Segmentals: Normal                     mmHg  Brachial         110  High thigh  Low thigh  Calf  Posterior tibial 132  Dorsalis pedis   130  Peroneal  Metatarsal  Toe pressure  Doppler:  PVR:  Ankle/Brachial: 1.20    INTERPRETATION/FINDINGS  PROCEDURE:  Evaluation of lower extremity arteries with systolic blood   pressure measurement at the ankle and brachial level and calculation  of the ankle/brachial pressure index (SILVERIO). Unless otherwise  indicated, the exam also includes pulse volume recording (PVR)  plethysmography at the ankle level. FINDINGS:  SILVERIO is normal on the right and the left. PVR waveforms are   normal at the right and left ankle. IMPRESSION:  No evidence of hemodynamically significant lower  extremity arterial obstruction. ADDITIONAL COMMENTS    I have personally reviewed the data relevant to the interpretation of  this  study. TECHNOLOGIST: Orquidea Mcgee.  Suresh Ching  Signed: 2018 06:00 PM    PHYSICIAN: Sheron Jenkins MD  Signed: 2018 07:18 AM 28-Dec-2023 06:36